# Patient Record
Sex: FEMALE | Race: OTHER | HISPANIC OR LATINO | URBAN - METROPOLITAN AREA
[De-identification: names, ages, dates, MRNs, and addresses within clinical notes are randomized per-mention and may not be internally consistent; named-entity substitution may affect disease eponyms.]

---

## 2017-11-18 ENCOUNTER — EMERGENCY (EMERGENCY)
Facility: HOSPITAL | Age: 27
LOS: 1 days | Discharge: HOME | End: 2017-11-18
Admitting: EMERGENCY MEDICINE
Payer: MEDICAID

## 2017-11-18 VITALS
TEMPERATURE: 98 F | RESPIRATION RATE: 15 BRPM | DIASTOLIC BLOOD PRESSURE: 73 MMHG | OXYGEN SATURATION: 98 % | SYSTOLIC BLOOD PRESSURE: 130 MMHG | HEART RATE: 83 BPM

## 2017-11-18 PROCEDURE — 99285 EMERGENCY DEPT VISIT HI MDM: CPT

## 2017-11-18 RX ORDER — KETOROLAC TROMETHAMINE 30 MG/ML
15 SYRINGE (ML) INJECTION ONCE
Qty: 0 | Refills: 0 | Status: DISCONTINUED | OUTPATIENT
Start: 2017-11-18 | End: 2017-11-18

## 2017-11-18 RX ORDER — DIAZEPAM 5 MG
1 TABLET ORAL
Qty: 8 | Refills: 0 | OUTPATIENT
Start: 2017-11-18 | End: 2017-11-20

## 2017-11-18 RX ADMIN — Medication 15 MILLIGRAM(S): at 22:57

## 2017-11-18 NOTE — ED PROVIDER NOTE - PLAN OF CARE
Use valium as prescribed, do not drive while taking this medication as it can make you sleepy. Follow up with spine clinic as needed. Rest, drink plenty of fluids.  Advance activity as tolerated.  Continue all previously prescribed medications as directed. You can use motrin 600mg every 6-8 hours for pain or fever, and/or Tylenol 650 mg every 4 hours for pain/fever. Follow up with your primary care physician in 48-72 hours- bring copies of your results.  Return to the emergency department for chest pain, shortness of breath, dizziness, or worsening, concerning, or persistent symptoms.

## 2017-11-18 NOTE — ED PROVIDER NOTE - CARE PLAN
Principal Discharge DX:	Back pain  Instructions for follow-up, activity and diet:	Use valium as prescribed, do not drive while taking this medication as it can make you sleepy. Follow up with spine clinic as needed. Rest, drink plenty of fluids.  Advance activity as tolerated.  Continue all previously prescribed medications as directed. You can use motrin 600mg every 6-8 hours for pain or fever, and/or Tylenol 650 mg every 4 hours for pain/fever. Follow up with your primary care physician in 48-72 hours- bring copies of your results.  Return to the emergency department for chest pain, shortness of breath, dizziness, or worsening, concerning, or persistent symptoms.

## 2017-11-18 NOTE — ED ADULT TRIAGE NOTE - CHIEF COMPLAINT QUOTE
Pt c/o mid back pain for the past three weeks.  Denies any burning upon urination or hematuria but states has had increased urinary frequency.  Pt states pain is unrelieved with ibuprofen.  Denies any PMHx.

## 2017-11-18 NOTE — ED PROVIDER NOTE - CHPI ED SYMPTOMS NEG
no weakness/no fever/no vomiting/no chills/no dizziness/no decreased eating/drinking/no numbness/no tingling/no nausea

## 2017-11-18 NOTE — ED PROVIDER NOTE - MEDICAL DECISION MAKING DETAILS
28 yo F here for back pain s/p heavy lifting. Otherwise well. + paraspinal msk tenderness on exam. no direct injury or trauma. +msk sprain, no midline or jurgen tenderness. No incontinence/numbness/saddle anesthesia or red flags. PLAN: nsaids, valium, heat back, fu ortho prn.

## 2017-11-18 NOTE — ED PROVIDER NOTE - PHYSICAL EXAMINATION
BACk: no midline tenderness from c spine to s spine, no palpable step offs, no erythema or ecchymosis or warmth, FROM, NVI, sensate intact. + paraspinal msk tenderness to lumbar area about L 4-5 + spasm.

## 2017-11-18 NOTE — ED PROVIDER NOTE - OBJECTIVE STATEMENT
26 yo F no pmhx here for back pain x 3 weeks. 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 reports 3 weeks ago she picked up luggage and since then has been having mid back pain. Took motrin with minimal relief. Otherwise without complaints. Denies fecal/urinary incontinence. Denies numbness tingling saddle anesthesia. Denies rash, IVUD, fever, chills, vomiting, diarrhea, nausea, abdominal pain, weakness dysuria hematuria

## 2018-02-12 ENCOUNTER — EMERGENCY (EMERGENCY)
Facility: HOSPITAL | Age: 28
LOS: 1 days | Discharge: LEFT BEFORE DISCHARGE | End: 2018-02-12
Admitting: EMERGENCY MEDICINE

## 2018-02-12 VITALS
RESPIRATION RATE: 16 BRPM | HEART RATE: 75 BPM | TEMPERATURE: 98 F | DIASTOLIC BLOOD PRESSURE: 62 MMHG | SYSTOLIC BLOOD PRESSURE: 154 MMHG | OXYGEN SATURATION: 100 %

## 2018-02-12 NOTE — ED ADULT TRIAGE NOTE - CHIEF COMPLAINT QUOTE
Pt arrives w/ c/o lower back pain for last 3 days. Rpts when this occurred a month ago it was muscle spasms.

## 2019-01-16 ENCOUNTER — EMERGENCY (EMERGENCY)
Facility: HOSPITAL | Age: 29
LOS: 1 days | Discharge: HOME | End: 2019-01-16
Admitting: EMERGENCY MEDICINE
Payer: MEDICAID

## 2019-01-16 VITALS
RESPIRATION RATE: 18 BRPM | DIASTOLIC BLOOD PRESSURE: 77 MMHG | HEART RATE: 67 BPM | SYSTOLIC BLOOD PRESSURE: 124 MMHG | TEMPERATURE: 98 F | OXYGEN SATURATION: 99 %

## 2019-01-16 PROCEDURE — 99283 EMERGENCY DEPT VISIT LOW MDM: CPT

## 2019-01-16 RX ORDER — CYCLOBENZAPRINE HYDROCHLORIDE 10 MG/1
10 TABLET, FILM COATED ORAL ONCE
Qty: 0 | Refills: 0 | Status: COMPLETED | OUTPATIENT
Start: 2019-01-16 | End: 2019-01-16

## 2019-01-16 RX ORDER — ACETAMINOPHEN 500 MG
975 TABLET ORAL ONCE
Qty: 0 | Refills: 0 | Status: COMPLETED | OUTPATIENT
Start: 2019-01-16 | End: 2019-01-16

## 2019-01-16 RX ORDER — CYCLOBENZAPRINE HYDROCHLORIDE 10 MG/1
5 TABLET, FILM COATED ORAL ONCE
Qty: 0 | Refills: 0 | Status: DISCONTINUED | OUTPATIENT
Start: 2019-01-16 | End: 2019-01-16

## 2019-01-16 RX ORDER — LIDOCAINE 4 G/100G
1 CREAM TOPICAL ONCE
Qty: 0 | Refills: 0 | Status: COMPLETED | OUTPATIENT
Start: 2019-01-16 | End: 2019-01-16

## 2019-01-16 RX ADMIN — LIDOCAINE 1 PATCH: 4 CREAM TOPICAL at 23:15

## 2019-01-16 RX ADMIN — Medication 975 MILLIGRAM(S): at 23:15

## 2019-01-16 RX ADMIN — CYCLOBENZAPRINE HYDROCHLORIDE 10 MILLIGRAM(S): 10 TABLET, FILM COATED ORAL at 23:31

## 2019-01-16 NOTE — ED PROVIDER NOTE - NSFOLLOWUPINSTRUCTIONS_ED_ALL_ED_FT
Rest, drink plenty of fluids.  Advance activity as tolerated.  Continue all previously prescribed medications as directed.  Follow up with your primary care physician in 48-72 hours- bring copies of your results.  Return to the ER for worsening or persistent symptoms, and/or ANY NEW OR CONCERNING SYMPTOMS. If you have issues obtaining follow up, please call: [phone] to obtain a doctor or specialist who takes your insurance in your area.  You may call [phone] to make an appointment with the internal medicine clinic.
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
## 2019-01-16 NOTE — ED PROVIDER NOTE - PROGRESS NOTE DETAILS
TI Dexter: Agree with TI Mcdaniel, pt neurovascularly intact, no cervical or thoracic spinous process tenderness. No CVA tenderness. Pt ambulatory does have worsening pain when raising R leg. TI Campbell: Pt states that her symptoms has been improving after taking medications. pt advised to return if symptoms do not improve. Dr. Martinez: I am administratively signing this document as per hospital policy.  I was available for consultation for this patient.  I did not evaluate the patient, did not have a doctor/patient relationship with the patient, and did not participate in any medical decision making or disposition decisions unless I am specifically named in the chart as having consulted on the patient.
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
## 2019-01-17 PROCEDURE — 72100 X-RAY EXAM L-S SPINE 2/3 VWS: CPT | Mod: 26

## 2019-01-17 RX ORDER — IBUPROFEN 200 MG
1 TABLET ORAL
Qty: 56 | Refills: 0 | OUTPATIENT
Start: 2019-01-17 | End: 2019-01-30

## 2019-01-17 RX ORDER — IBUPROFEN 200 MG
600 TABLET ORAL ONCE
Qty: 0 | Refills: 0 | Status: COMPLETED | OUTPATIENT
Start: 2019-01-17 | End: 2019-01-17

## 2019-01-17 RX ORDER — CYCLOBENZAPRINE HYDROCHLORIDE 10 MG/1
1 TABLET, FILM COATED ORAL
Qty: 15 | Refills: 0 | OUTPATIENT
Start: 2019-01-17 | End: 2019-01-21

## 2019-01-17 RX ORDER — IBUPROFEN 200 MG
1 TABLET ORAL
Qty: 20 | Refills: 0 | OUTPATIENT
Start: 2019-01-17 | End: 2019-01-21

## 2019-01-17 RX ADMIN — Medication 30 MILLILITER(S): at 00:19

## 2019-01-17 RX ADMIN — Medication 600 MILLIGRAM(S): at 00:19

## 2019-01-17 NOTE — ED POST DISCHARGE NOTE - REASON FOR FOLLOW-UP
Other Pt called rx for motrin and flexeril not at her pharmacy.   Rx's resent to same pharmacy for flexeril 10 mg 1 tab three times a day for 5 days and ibuprofen 600 every 6 hours both prn as written  by dr hills yesterday

## 2019-03-04 ENCOUNTER — EMERGENCY (EMERGENCY)
Facility: HOSPITAL | Age: 29
LOS: 1 days | Discharge: HOME | End: 2019-03-04
Attending: EMERGENCY MEDICINE | Admitting: EMERGENCY MEDICINE
Payer: MEDICAID

## 2019-03-04 VITALS
RESPIRATION RATE: 18 BRPM | SYSTOLIC BLOOD PRESSURE: 120 MMHG | DIASTOLIC BLOOD PRESSURE: 84 MMHG | TEMPERATURE: 98 F | OXYGEN SATURATION: 100 % | HEART RATE: 95 BPM

## 2019-03-04 PROCEDURE — 72220 X-RAY EXAM SACRUM TAILBONE: CPT | Mod: 26

## 2019-03-04 PROCEDURE — 99283 EMERGENCY DEPT VISIT LOW MDM: CPT

## 2019-03-04 RX ORDER — METHOCARBAMOL 500 MG/1
2 TABLET, FILM COATED ORAL
Qty: 30 | Refills: 0 | OUTPATIENT
Start: 2019-03-04 | End: 2019-03-08

## 2019-03-04 RX ORDER — IBUPROFEN 200 MG
1 TABLET ORAL
Qty: 56 | Refills: 0 | OUTPATIENT
Start: 2019-03-04 | End: 2019-03-17

## 2019-03-04 RX ORDER — KETOROLAC TROMETHAMINE 30 MG/ML
30 SYRINGE (ML) INJECTION ONCE
Qty: 0 | Refills: 0 | Status: DISCONTINUED | OUTPATIENT
Start: 2019-03-04 | End: 2019-03-04

## 2019-03-04 RX ORDER — METHOCARBAMOL 500 MG/1
1500 TABLET, FILM COATED ORAL ONCE
Qty: 0 | Refills: 0 | Status: COMPLETED | OUTPATIENT
Start: 2019-03-04 | End: 2019-03-04

## 2019-03-04 RX ORDER — LIDOCAINE 4 G/100G
1 CREAM TOPICAL ONCE
Qty: 0 | Refills: 0 | Status: COMPLETED | OUTPATIENT
Start: 2019-03-04 | End: 2019-03-04

## 2019-03-04 RX ORDER — ACETAMINOPHEN 500 MG
650 TABLET ORAL ONCE
Qty: 0 | Refills: 0 | Status: COMPLETED | OUTPATIENT
Start: 2019-03-04 | End: 2019-03-04

## 2019-03-04 RX ADMIN — LIDOCAINE 1 PATCH: 4 CREAM TOPICAL at 17:29

## 2019-03-04 RX ADMIN — METHOCARBAMOL 1500 MILLIGRAM(S): 500 TABLET, FILM COATED ORAL at 17:59

## 2019-03-04 RX ADMIN — Medication 30 MILLIGRAM(S): at 17:29

## 2019-03-04 RX ADMIN — Medication 650 MILLIGRAM(S): at 17:30

## 2019-03-04 NOTE — ED PROVIDER NOTE - PROGRESS NOTE DETAILS
Taveras: improved.  ambulating. xr shows fracture at tip of coccyx.  dx fracture at tip of coccyx.  rx robaxin and ibuprofen.  can also take tylenol 650mg every 4 hrs as needed,  heat packs, obtain physical therapy, no heavy lifting.  obtain donut pillow for seating. left ambulatory.  return precautions given.

## 2019-03-04 NOTE — ED ADULT TRIAGE NOTE - CHIEF COMPLAINT QUOTE
Pt s/p fall slipped 2 weeks ago injuring coccyx bone. pt reports  increased pain . pt ambulatory into triage araujo.
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
## 2019-08-10 ENCOUNTER — EMERGENCY (EMERGENCY)
Facility: HOSPITAL | Age: 29
LOS: 1 days | Discharge: HOME | End: 2019-08-10
Attending: EMERGENCY MEDICINE | Admitting: EMERGENCY MEDICINE
Payer: MEDICAID

## 2019-08-10 VITALS
RESPIRATION RATE: 18 BRPM | DIASTOLIC BLOOD PRESSURE: 62 MMHG | HEART RATE: 82 BPM | OXYGEN SATURATION: 100 % | SYSTOLIC BLOOD PRESSURE: 107 MMHG | TEMPERATURE: 98 F

## 2019-08-10 LAB
ALBUMIN SERPL ELPH-MCNC: 4.3 G/DL — SIGNIFICANT CHANGE UP (ref 3.3–5)
ALP SERPL-CCNC: 77 U/L — SIGNIFICANT CHANGE UP (ref 40–120)
ALT FLD-CCNC: 19 U/L — SIGNIFICANT CHANGE UP (ref 4–33)
ANION GAP SERPL CALC-SCNC: 16 MMO/L — HIGH (ref 7–14)
AST SERPL-CCNC: 16 U/L — SIGNIFICANT CHANGE UP (ref 4–32)
BASOPHILS # BLD AUTO: 0.05 K/UL — SIGNIFICANT CHANGE UP (ref 0–0.2)
BASOPHILS NFR BLD AUTO: 0.6 % — SIGNIFICANT CHANGE UP (ref 0–2)
BILIRUB SERPL-MCNC: 0.4 MG/DL — SIGNIFICANT CHANGE UP (ref 0.2–1.2)
BUN SERPL-MCNC: 14 MG/DL — SIGNIFICANT CHANGE UP (ref 7–23)
CALCIUM SERPL-MCNC: 9.5 MG/DL — SIGNIFICANT CHANGE UP (ref 8.4–10.5)
CHLORIDE SERPL-SCNC: 105 MMOL/L — SIGNIFICANT CHANGE UP (ref 98–107)
CO2 SERPL-SCNC: 19 MMOL/L — LOW (ref 22–31)
CREAT SERPL-MCNC: 0.75 MG/DL — SIGNIFICANT CHANGE UP (ref 0.5–1.3)
EOSINOPHIL # BLD AUTO: 0.32 K/UL — SIGNIFICANT CHANGE UP (ref 0–0.5)
EOSINOPHIL NFR BLD AUTO: 4.1 % — SIGNIFICANT CHANGE UP (ref 0–6)
GLUCOSE SERPL-MCNC: 93 MG/DL — SIGNIFICANT CHANGE UP (ref 70–99)
HCT VFR BLD CALC: 37.7 % — SIGNIFICANT CHANGE UP (ref 34.5–45)
HGB BLD-MCNC: 12.4 G/DL — SIGNIFICANT CHANGE UP (ref 11.5–15.5)
IMM GRANULOCYTES NFR BLD AUTO: 0.3 % — SIGNIFICANT CHANGE UP (ref 0–1.5)
LYMPHOCYTES # BLD AUTO: 2.25 K/UL — SIGNIFICANT CHANGE UP (ref 1–3.3)
LYMPHOCYTES # BLD AUTO: 28.7 % — SIGNIFICANT CHANGE UP (ref 13–44)
MCHC RBC-ENTMCNC: 30.2 PG — SIGNIFICANT CHANGE UP (ref 27–34)
MCHC RBC-ENTMCNC: 32.9 % — SIGNIFICANT CHANGE UP (ref 32–36)
MCV RBC AUTO: 92 FL — SIGNIFICANT CHANGE UP (ref 80–100)
MONOCYTES # BLD AUTO: 0.62 K/UL — SIGNIFICANT CHANGE UP (ref 0–0.9)
MONOCYTES NFR BLD AUTO: 7.9 % — SIGNIFICANT CHANGE UP (ref 2–14)
NEUTROPHILS # BLD AUTO: 4.59 K/UL — SIGNIFICANT CHANGE UP (ref 1.8–7.4)
NEUTROPHILS NFR BLD AUTO: 58.4 % — SIGNIFICANT CHANGE UP (ref 43–77)
NRBC # FLD: 0 K/UL — SIGNIFICANT CHANGE UP (ref 0–0)
PLATELET # BLD AUTO: 268 K/UL — SIGNIFICANT CHANGE UP (ref 150–400)
PMV BLD: 9.7 FL — SIGNIFICANT CHANGE UP (ref 7–13)
POTASSIUM SERPL-MCNC: 3.9 MMOL/L — SIGNIFICANT CHANGE UP (ref 3.5–5.3)
POTASSIUM SERPL-SCNC: 3.9 MMOL/L — SIGNIFICANT CHANGE UP (ref 3.5–5.3)
PROT SERPL-MCNC: 7.7 G/DL — SIGNIFICANT CHANGE UP (ref 6–8.3)
RBC # BLD: 4.1 M/UL — SIGNIFICANT CHANGE UP (ref 3.8–5.2)
RBC # FLD: 13.4 % — SIGNIFICANT CHANGE UP (ref 10.3–14.5)
SODIUM SERPL-SCNC: 140 MMOL/L — SIGNIFICANT CHANGE UP (ref 135–145)
WBC # BLD: 7.85 K/UL — SIGNIFICANT CHANGE UP (ref 3.8–10.5)
WBC # FLD AUTO: 7.85 K/UL — SIGNIFICANT CHANGE UP (ref 3.8–10.5)

## 2019-08-10 PROCEDURE — 74177 CT ABD & PELVIS W/CONTRAST: CPT | Mod: 26

## 2019-08-10 PROCEDURE — 99284 EMERGENCY DEPT VISIT MOD MDM: CPT

## 2019-08-10 RX ORDER — ACETAMINOPHEN 500 MG
975 TABLET ORAL ONCE
Refills: 0 | Status: COMPLETED | OUTPATIENT
Start: 2019-08-10 | End: 2019-08-10

## 2019-08-10 RX ADMIN — Medication 975 MILLIGRAM(S): at 16:57

## 2019-08-10 NOTE — ED ADULT TRIAGE NOTE - CHIEF COMPLAINT QUOTE
c/o abd pain at the incision site. pt reports s/p bariatric surgery last month, denies fever or chills, N.V. site is clear and WNL, no discharge or drainage noted, area is tender to touch.

## 2019-08-10 NOTE — ED PROVIDER NOTE - PHYSICAL EXAMINATION
Gen: AAOx3, non-toxic  Head: NCAT  Lung: CTAB, no respiratory distress, no wheezes/rhonchi/rales B/L, speaking in full sentences  CV: RRR, no murmurs, rubs or gallops  Abd: soft, tenderness in area 6cm superior and 3cm to the right of the umbilicus, no skin changes or fluctuance noted over area of tenderness, surgical wounds are well healing, no erythema or drainage, no CVA tenderness  MSK: no visible deformities  Neuro: No focal sensory or motor deficits  Skin: Warm, well perfused, no rash  Psych: normal affect.   ~Ramiro Valero PGY2

## 2019-08-10 NOTE — ED PROVIDER NOTE - OBJECTIVE STATEMENT
29y female with PMH of cholecystectomy, tuboligation, and gastric sleeve 3 weeks ago at [facility] presenting with abdominal pain. Started right after the surgery, burning/tearing pain approx. 6cm superior and 3cm to the right of the umbilicus, pain is worse with any movement or palpation of the area. Tolerating PO, not worse with eating, no diarrhea, no fevers, chills or vomiting. She has been constipated since the surgey 1-2 BM a week.

## 2019-08-10 NOTE — ED PROVIDER NOTE - CLINICAL SUMMARY MEDICAL DECISION MAKING FREE TEXT BOX
29y female with abdominal pain after gastric sleeve. Area of tenderness appears to be on abdominal wall, tender to light palpation, no skin changes. Concern for fluid collection or hernia. Infectious cause less likely given no infectious symptoms. Patient is tolerating PO, no vomiting, having BM not concerned for obstruction. Will get labs, CT abd/pel.

## 2019-08-10 NOTE — ED PROVIDER NOTE - NSFOLLOWUPINSTRUCTIONS_ED_ALL_ED_FT
Follow-up with your surgeon at [facility] on Monday for further evaluation. Discuss your CT results today with them at follow-up.     Follow post-operative instructions as described by your surgeon.     Return to the ED for any worsening pain, fevers, redness, drainage from the incision sites, or any new or worsening symptoms.

## 2019-08-10 NOTE — ED PROVIDER NOTE - ATTENDING CONTRIBUTION TO CARE
I performed a face-to-face evaluation of the patient and performed a history and physical examination. I agree with the history and physical examination.    S/p gastric sleeve surg. 3 wks ago. P/w RLQ pain. Tolerating PO. Incision sites appear uninfected. TTP RLQ. Concern for internal hernia. Labs, CT. I performed a face-to-face evaluation of the patient and performed a history and physical examination. I agree with the history and physical examination.    S/p gastric sleeve surg. 3 wks ago. P/w pain R abd b/w upper and lower quarants. Tolerating PO. Incision sites appear uninfected. TTP R middle abd. Concern for internal hernia or seroma. Labs, CT.

## 2019-08-10 NOTE — ED PROVIDER NOTE - PROGRESS NOTE DETAILS
Ford- received stable signout of patient, CT results pending. CT results show post-operative changes, no fluid collection or obstruction. Patient to follow-up with her surgeon at [facility] for further discussion and evaluation of her pain.     Patient reassessed - states pain is somewhat improved from before. CT results discussed with patient and she is amenable to f/u with her surgeon at [facility]. States she feels okay and would like to leave to go  her daughter.

## 2019-08-10 NOTE — ED PROVIDER NOTE - NS ED ROS FT
Gen: No fever, No chills  Eyes: No vision changes, eye irritation or discharge  Resp: No cough or SOB  Cardiovascular: No chest pain or palpitations  GI: +abdominal pain No nausea/vomiting, diarrhea, constipation  :  No change in urine output or frequency; no dysuria  MS: No joint or muscle pain  Skin: No rashes  Neuro: No headache; No numbness or weakness  Remainder negative, except as per the HPI

## 2019-09-10 ENCOUNTER — EMERGENCY (EMERGENCY)
Facility: HOSPITAL | Age: 29
LOS: 1 days | Discharge: HOME | End: 2019-09-10
Attending: STUDENT IN AN ORGANIZED HEALTH CARE EDUCATION/TRAINING PROGRAM | Admitting: STUDENT IN AN ORGANIZED HEALTH CARE EDUCATION/TRAINING PROGRAM
Payer: MEDICAID

## 2019-09-10 VITALS
DIASTOLIC BLOOD PRESSURE: 65 MMHG | RESPIRATION RATE: 18 BRPM | OXYGEN SATURATION: 99 % | SYSTOLIC BLOOD PRESSURE: 115 MMHG | TEMPERATURE: 98 F | HEART RATE: 74 BPM

## 2019-09-10 LAB
ALBUMIN SERPL ELPH-MCNC: 4 G/DL — SIGNIFICANT CHANGE UP (ref 3.3–5)
ALP SERPL-CCNC: 81 U/L — SIGNIFICANT CHANGE UP (ref 40–120)
ALT FLD-CCNC: 15 U/L — SIGNIFICANT CHANGE UP (ref 4–33)
ANION GAP SERPL CALC-SCNC: 8 MMO/L — SIGNIFICANT CHANGE UP (ref 7–14)
AST SERPL-CCNC: 16 U/L — SIGNIFICANT CHANGE UP (ref 4–32)
BASOPHILS # BLD AUTO: 0.02 K/UL — SIGNIFICANT CHANGE UP (ref 0–0.2)
BASOPHILS NFR BLD AUTO: 0.3 % — SIGNIFICANT CHANGE UP (ref 0–2)
BILIRUB SERPL-MCNC: 0.6 MG/DL — SIGNIFICANT CHANGE UP (ref 0.2–1.2)
BUN SERPL-MCNC: 14 MG/DL — SIGNIFICANT CHANGE UP (ref 7–23)
CALCIUM SERPL-MCNC: 9.3 MG/DL — SIGNIFICANT CHANGE UP (ref 8.4–10.5)
CHLORIDE SERPL-SCNC: 107 MMOL/L — SIGNIFICANT CHANGE UP (ref 98–107)
CO2 SERPL-SCNC: 21 MMOL/L — LOW (ref 22–31)
CREAT SERPL-MCNC: 0.68 MG/DL — SIGNIFICANT CHANGE UP (ref 0.5–1.3)
EOSINOPHIL # BLD AUTO: 0.08 K/UL — SIGNIFICANT CHANGE UP (ref 0–0.5)
EOSINOPHIL NFR BLD AUTO: 1.1 % — SIGNIFICANT CHANGE UP (ref 0–6)
GLUCOSE SERPL-MCNC: 101 MG/DL — HIGH (ref 70–99)
HCT VFR BLD CALC: 39.5 % — SIGNIFICANT CHANGE UP (ref 34.5–45)
HGB BLD-MCNC: 13.2 G/DL — SIGNIFICANT CHANGE UP (ref 11.5–15.5)
IMM GRANULOCYTES NFR BLD AUTO: 0.4 % — SIGNIFICANT CHANGE UP (ref 0–1.5)
LYMPHOCYTES # BLD AUTO: 1.71 K/UL — SIGNIFICANT CHANGE UP (ref 1–3.3)
LYMPHOCYTES # BLD AUTO: 22.5 % — SIGNIFICANT CHANGE UP (ref 13–44)
MAGNESIUM SERPL-MCNC: 2.1 MG/DL — SIGNIFICANT CHANGE UP (ref 1.6–2.6)
MCHC RBC-ENTMCNC: 31.3 PG — SIGNIFICANT CHANGE UP (ref 27–34)
MCHC RBC-ENTMCNC: 33.4 % — SIGNIFICANT CHANGE UP (ref 32–36)
MCV RBC AUTO: 93.6 FL — SIGNIFICANT CHANGE UP (ref 80–100)
MONOCYTES # BLD AUTO: 0.47 K/UL — SIGNIFICANT CHANGE UP (ref 0–0.9)
MONOCYTES NFR BLD AUTO: 6.2 % — SIGNIFICANT CHANGE UP (ref 2–14)
NEUTROPHILS # BLD AUTO: 5.28 K/UL — SIGNIFICANT CHANGE UP (ref 1.8–7.4)
NEUTROPHILS NFR BLD AUTO: 69.5 % — SIGNIFICANT CHANGE UP (ref 43–77)
NRBC # FLD: 0 K/UL — SIGNIFICANT CHANGE UP (ref 0–0)
PHOSPHATE SERPL-MCNC: 3.6 MG/DL — SIGNIFICANT CHANGE UP (ref 2.5–4.5)
PLATELET # BLD AUTO: 248 K/UL — SIGNIFICANT CHANGE UP (ref 150–400)
PMV BLD: 9.9 FL — SIGNIFICANT CHANGE UP (ref 7–13)
POTASSIUM SERPL-MCNC: 3.5 MMOL/L — SIGNIFICANT CHANGE UP (ref 3.5–5.3)
POTASSIUM SERPL-SCNC: 3.5 MMOL/L — SIGNIFICANT CHANGE UP (ref 3.5–5.3)
PROT SERPL-MCNC: 7.3 G/DL — SIGNIFICANT CHANGE UP (ref 6–8.3)
RBC # BLD: 4.22 M/UL — SIGNIFICANT CHANGE UP (ref 3.8–5.2)
RBC # FLD: 14.3 % — SIGNIFICANT CHANGE UP (ref 10.3–14.5)
SODIUM SERPL-SCNC: 136 MMOL/L — SIGNIFICANT CHANGE UP (ref 135–145)
WBC # BLD: 7.59 K/UL — SIGNIFICANT CHANGE UP (ref 3.8–10.5)
WBC # FLD AUTO: 7.59 K/UL — SIGNIFICANT CHANGE UP (ref 3.8–10.5)

## 2019-09-10 PROCEDURE — 99284 EMERGENCY DEPT VISIT MOD MDM: CPT

## 2019-09-10 RX ORDER — FAMOTIDINE 10 MG/ML
1 INJECTION INTRAVENOUS
Qty: 10 | Refills: 0
Start: 2019-09-10 | End: 2019-09-14

## 2019-09-10 RX ORDER — DIPHENHYDRAMINE HCL 50 MG
50 CAPSULE ORAL ONCE
Refills: 0 | Status: COMPLETED | OUTPATIENT
Start: 2019-09-10 | End: 2019-09-10

## 2019-09-10 RX ORDER — FAMOTIDINE 10 MG/ML
20 INJECTION INTRAVENOUS ONCE
Refills: 0 | Status: COMPLETED | OUTPATIENT
Start: 2019-09-10 | End: 2019-09-10

## 2019-09-10 RX ADMIN — Medication 50 MILLIGRAM(S): at 17:12

## 2019-09-10 RX ADMIN — FAMOTIDINE 20 MILLIGRAM(S): 10 INJECTION INTRAVENOUS at 17:12

## 2019-09-10 NOTE — ED ADULT NURSE NOTE - NSIMPLEMENTINTERV_GEN_ALL_ED
Implemented All Universal Safety Interventions:  [place] to call system. Call bell, personal items and telephone within reach. Instruct patient to call for assistance. Room bathroom lighting operational. Non-slip footwear when patient is off stretcher. Physically safe environment: no spills, clutter or unnecessary equipment. Stretcher in lowest position, wheels locked, appropriate side rails in place.

## 2019-09-10 NOTE — ED PROVIDER NOTE - CLINICAL SUMMARY MEDICAL DECISION MAKING FREE TEXT BOX
29F p/w puritic rash and scratchy throat since 3pm yesterday after eating out at restaurant with no other allergen/environmental exposures - will give pepcid, benadryl, prednisone, monitor for 4 hours 29F p/w puritic rash and since 3pm yesterday after eating out at restaurant with no other allergen/environmental exposures - will give pepcid, benadryl, prednisone

## 2019-09-10 NOTE — ED PROVIDER NOTE - PATIENT PORTAL LINK FT
You can access the FollowMyHealth Patient Portal offered by [facility] by registering at the following website: [email or web address]. By joining [employer]’s FollowMyHealth portal, you will also be able to view your health information using other applications (apps) compatible with our system.

## 2019-09-10 NOTE — ED PROVIDER NOTE - NSFOLLOWUPINSTRUCTIONS_ED_ALL_ED_FT
Please follow up with your primary care doctor within the week  Continue benadryl 25mg every 8 hours, pepcid 20mg two times a day and prednisone 50mg daily for the next 4 days to complete a full 5 day course  Return to ER with any new/worsening symptoms including facial swelling, throat itching, difficulty breathing/swallowing, abdominal pain, or anything else concerning to you

## 2019-09-10 NOTE — ED ADULT TRIAGE NOTE - CHIEF COMPLAINT QUOTE
Pt is here with the c/o hives all over her body, her eyes are watery and itchy. Pt had broccoli from a place that she does not usually eat. She started to have this reaction yesterday.

## 2019-09-10 NOTE — ED ADULT NURSE NOTE - OBJECTIVE STATEMENT
Pt received in intake spot 9, A&Ox4, NAD.  c/o allergic reaction since yesterday after eating at a new restaurant.  Pt endorses itchiness and hives.  Pt endorses mild throat itching but denies any difficulty swallowing to breathing.  Pt appears comfortable.  Tolerating PO.  Will continue to monitor.

## 2019-09-10 NOTE — ED PROVIDER NOTE - OBJECTIVE STATEMENT
29F no PMH p/w rashes and puritis since yesterday. Pt states that she was eating chicken and broccoli at restaurant and symptoms started soon after (~1500 yesterday). States that she took benadryl 50mg x2 yesterday without improvement in puritis. Rash diffusely to chest, arms, legs and face. Denies chest pain, SOB, difficulty swallowing. Reports some "itching" in back of throat but has been eating without difficulty. No tongue/facial swelling. No other new allergen or environmental exposures. 29F no PMH p/w rashes and puritis since yesterday. Pt states that she was eating chicken and broccoli at restaurant and symptoms started soon after (~1500 yesterday). States that she took benadryl 50mg x2 yesterday without improvement in puritis. Rash diffusely to chest, arms, legs and face. Denies chest pain, SOB, difficulty swallowing. Reports some "itching" in back of throat yesterday that resolved and has been eating without difficulty. No tongue/facial swelling. No other new allergen or environmental exposures.

## 2021-03-25 ENCOUNTER — EMERGENCY (EMERGENCY)
Facility: HOSPITAL | Age: 31
LOS: 1 days | Discharge: HOME | End: 2021-03-25
Attending: STUDENT IN AN ORGANIZED HEALTH CARE EDUCATION/TRAINING PROGRAM | Admitting: STUDENT IN AN ORGANIZED HEALTH CARE EDUCATION/TRAINING PROGRAM
Payer: MEDICAID

## 2021-03-25 VITALS
HEART RATE: 77 BPM | SYSTOLIC BLOOD PRESSURE: 113 MMHG | DIASTOLIC BLOOD PRESSURE: 67 MMHG | TEMPERATURE: 98 F | OXYGEN SATURATION: 100 % | RESPIRATION RATE: 16 BRPM

## 2021-03-25 LAB
ALBUMIN SERPL ELPH-MCNC: 3.9 G/DL — SIGNIFICANT CHANGE UP (ref 3.3–5)
ALP SERPL-CCNC: 72 U/L — SIGNIFICANT CHANGE UP (ref 40–120)
ALT FLD-CCNC: 12 U/L — SIGNIFICANT CHANGE UP (ref 4–33)
ANION GAP SERPL CALC-SCNC: 8 MMOL/L — SIGNIFICANT CHANGE UP (ref 7–14)
APPEARANCE UR: CLEAR — SIGNIFICANT CHANGE UP
AST SERPL-CCNC: 18 U/L — SIGNIFICANT CHANGE UP (ref 4–32)
BASOPHILS # BLD AUTO: 0.02 K/UL — SIGNIFICANT CHANGE UP (ref 0–0.2)
BASOPHILS NFR BLD AUTO: 0.3 % — SIGNIFICANT CHANGE UP (ref 0–2)
BILIRUB SERPL-MCNC: 0.5 MG/DL — SIGNIFICANT CHANGE UP (ref 0.2–1.2)
BILIRUB UR-MCNC: NEGATIVE — SIGNIFICANT CHANGE UP
BUN SERPL-MCNC: 14 MG/DL — SIGNIFICANT CHANGE UP (ref 7–23)
CALCIUM SERPL-MCNC: 8.9 MG/DL — SIGNIFICANT CHANGE UP (ref 8.4–10.5)
CHLORIDE SERPL-SCNC: 105 MMOL/L — SIGNIFICANT CHANGE UP (ref 98–107)
CO2 SERPL-SCNC: 22 MMOL/L — SIGNIFICANT CHANGE UP (ref 22–31)
COLOR SPEC: SIGNIFICANT CHANGE UP
CREAT SERPL-MCNC: 0.56 MG/DL — SIGNIFICANT CHANGE UP (ref 0.5–1.3)
DIFF PNL FLD: NEGATIVE — SIGNIFICANT CHANGE UP
EOSINOPHIL # BLD AUTO: 0.1 K/UL — SIGNIFICANT CHANGE UP (ref 0–0.5)
EOSINOPHIL NFR BLD AUTO: 1.3 % — SIGNIFICANT CHANGE UP (ref 0–6)
GLUCOSE SERPL-MCNC: 83 MG/DL — SIGNIFICANT CHANGE UP (ref 70–99)
GLUCOSE UR QL: NEGATIVE — SIGNIFICANT CHANGE UP
HCG UR QL: NEGATIVE — SIGNIFICANT CHANGE UP
HCT VFR BLD CALC: 31.8 % — LOW (ref 34.5–45)
HGB BLD-MCNC: 10.6 G/DL — LOW (ref 11.5–15.5)
IANC: 6.14 K/UL — SIGNIFICANT CHANGE UP (ref 1.5–8.5)
IMM GRANULOCYTES NFR BLD AUTO: 0.3 % — SIGNIFICANT CHANGE UP (ref 0–1.5)
KETONES UR-MCNC: ABNORMAL
LEUKOCYTE ESTERASE UR-ACNC: NEGATIVE — SIGNIFICANT CHANGE UP
LYMPHOCYTES # BLD AUTO: 0.99 K/UL — LOW (ref 1–3.3)
LYMPHOCYTES # BLD AUTO: 12.7 % — LOW (ref 13–44)
MAGNESIUM SERPL-MCNC: 2 MG/DL — SIGNIFICANT CHANGE UP (ref 1.6–2.6)
MCHC RBC-ENTMCNC: 30.8 PG — SIGNIFICANT CHANGE UP (ref 27–34)
MCHC RBC-ENTMCNC: 33.3 GM/DL — SIGNIFICANT CHANGE UP (ref 32–36)
MCV RBC AUTO: 92.4 FL — SIGNIFICANT CHANGE UP (ref 80–100)
MONOCYTES # BLD AUTO: 0.52 K/UL — SIGNIFICANT CHANGE UP (ref 0–0.9)
MONOCYTES NFR BLD AUTO: 6.7 % — SIGNIFICANT CHANGE UP (ref 2–14)
NEUTROPHILS # BLD AUTO: 6.14 K/UL — SIGNIFICANT CHANGE UP (ref 1.8–7.4)
NEUTROPHILS NFR BLD AUTO: 78.7 % — HIGH (ref 43–77)
NITRITE UR-MCNC: NEGATIVE — SIGNIFICANT CHANGE UP
NRBC # BLD: 0 /100 WBCS — SIGNIFICANT CHANGE UP
NRBC # FLD: 0 K/UL — SIGNIFICANT CHANGE UP
PH UR: 6.5 — SIGNIFICANT CHANGE UP (ref 5–8)
PLATELET # BLD AUTO: 238 K/UL — SIGNIFICANT CHANGE UP (ref 150–400)
POTASSIUM SERPL-MCNC: 3.7 MMOL/L — SIGNIFICANT CHANGE UP (ref 3.5–5.3)
POTASSIUM SERPL-SCNC: 3.7 MMOL/L — SIGNIFICANT CHANGE UP (ref 3.5–5.3)
PROT SERPL-MCNC: 7.4 G/DL — SIGNIFICANT CHANGE UP (ref 6–8.3)
PROT UR-MCNC: NEGATIVE — SIGNIFICANT CHANGE UP
RBC # BLD: 3.44 M/UL — LOW (ref 3.8–5.2)
RBC # FLD: 12.5 % — SIGNIFICANT CHANGE UP (ref 10.3–14.5)
SODIUM SERPL-SCNC: 135 MMOL/L — SIGNIFICANT CHANGE UP (ref 135–145)
SP GR SPEC: 1.02 — SIGNIFICANT CHANGE UP (ref 1.01–1.02)
UROBILINOGEN FLD QL: SIGNIFICANT CHANGE UP
WBC # BLD: 7.79 K/UL — SIGNIFICANT CHANGE UP (ref 3.8–10.5)
WBC # FLD AUTO: 7.79 K/UL — SIGNIFICANT CHANGE UP (ref 3.8–10.5)

## 2021-03-25 PROCEDURE — 99284 EMERGENCY DEPT VISIT MOD MDM: CPT

## 2021-03-25 RX ORDER — ACETAMINOPHEN 500 MG
650 TABLET ORAL ONCE
Refills: 0 | Status: COMPLETED | OUTPATIENT
Start: 2021-03-25 | End: 2021-03-25

## 2021-03-25 RX ORDER — DEXAMETHASONE 0.5 MG/5ML
6 ELIXIR ORAL ONCE
Refills: 0 | Status: COMPLETED | OUTPATIENT
Start: 2021-03-25 | End: 2021-03-25

## 2021-03-25 RX ORDER — SODIUM CHLORIDE 9 MG/ML
1000 INJECTION, SOLUTION INTRAVENOUS ONCE
Refills: 0 | Status: COMPLETED | OUTPATIENT
Start: 2021-03-25 | End: 2021-03-25

## 2021-03-25 RX ORDER — KETOROLAC TROMETHAMINE 30 MG/ML
15 SYRINGE (ML) INJECTION ONCE
Refills: 0 | Status: DISCONTINUED | OUTPATIENT
Start: 2021-03-25 | End: 2021-03-25

## 2021-03-25 RX ADMIN — Medication 15 MILLIGRAM(S): at 09:52

## 2021-03-25 RX ADMIN — SODIUM CHLORIDE 1000 MILLILITER(S): 9 INJECTION, SOLUTION INTRAVENOUS at 09:52

## 2021-03-25 RX ADMIN — Medication 6 MILLIGRAM(S): at 09:52

## 2021-03-25 RX ADMIN — Medication 650 MILLIGRAM(S): at 09:52

## 2021-03-25 NOTE — ED PROVIDER NOTE - PATIENT PORTAL LINK FT
You can access the FollowMyHealth Patient Portal offered by [facility] by registering at the following website: [email or web address]. By joining [employer]’s FollowMyHealth portal, you will also be able to view your health information using other applications (apps) compatible with our system.

## 2021-03-25 NOTE — ED PROVIDER NOTE - OBJECTIVE STATEMENT
31 yo F w/o sig PMH, presenting s/p right wisdom tooth extraction 5 days ago, complaining of worsening pain, swelling and difficulty swallowing solids and liquids since. She states that her oral surgeon started her on erythrocin initially but then switched her to keflex yesterday because the other pills "weren't working." She has been taking 800 mg ibuprofen for pain without improvement. She says that when she lies flat she has some trouble breathing and has had difficulty sleeping over the past few nights because of that. She denies any fevers, chills, chest pain, shortness of breath, cough, oral bleeding or drainage, difficulty tolerating her own secretions, drooling, or other complaints at this time.

## 2021-03-25 NOTE — ED ADULT NURSE NOTE - CHIEF COMPLAINT QUOTE
Pt s/p R wisdom tooth extraction last Sat c/o difficulty swallowing, throat pain and facial swelling, with chills.  Pt stated she took cephalexin  this am, and feels like its stuck in her throat.  No stridor noted.  Resp unlabored at triage.   #[phone]

## 2021-03-25 NOTE — ED ADULT NURSE NOTE - OBJECTIVE STATEMENT
29y/o female A&ox4, ambulatory received in rm13. pt s/p R wisdom tooth surgery this past saturday and is c/o difficulty swallowing and throat pain. pt believes oral antibiotic pill is stuck in throat. pt able to complete full sentences, no use of accessory muscles noted. mild R sided facial swelling noted, no drooling noted around mouth. 20g iv placed in LAC, labs drawn and sent. medicated as per MD orders. bed in lowest position, side rails up, call bell in hand, safety maintained. awaiting further orders. will continue to monitor.

## 2021-03-25 NOTE — ED PROVIDER NOTE - ATTENDING CONTRIBUTION TO CARE
31 y/o F with no PMH p/w right sided jaw pain and swelling. Pt had her wisdom teeth removed on saturday. Since then she has been having increased swelling of the right sided of the neck and mouth along with pain. She reports that she has pain worse with swallowing. She denies fever, she was recently started on keflex after not improving on azithro. She denies chest pain, drooling, cough. She states she feels like she has a pill stuck in her throat .   denies fever, chills, chest pain, SOB, abdominal pain, diarrhea, dysuria, syncope, bleeding, new rash,weakness, numbness, blurred vision  + sore throat   ROS  otherwise negative as per HPI  Gen: Awake, Alert, WD, WN, NAD  Head:  NC/AT  Eyes:  PERRL, EOMI, Conjunctiva pink, lids normal, no scleral icterus  ENT: OP clear, no exudates, no erythema, uvula midline mild swelling on right lower jaw, moist mucus membranes, mild trismus   Neck: supple, tender on right side lower, trachea midline  Cardiac/CV:  S1 S2, RRR, no M/G/R  Respiratory/Pulm:  CTAB, good air movement, normal resp effort, no wheezes/stridor/retractions/rales/rhonchi  Gastrointestinal/Abdomen:  Soft, nontender, nondistended, +BS, no rebound/guarding  Back:  no CVAT, no MLT  Ext:  warm, well perfused, moving all extremities spontaneously, no peripheral edema, distal pulses intact  Skin: intact, no rash  Neuro:  AAOx3, sensation intact, motor 5/5 x 4 extremities, normal gait, speech clear  MDM as above

## 2021-03-25 NOTE — ED PROVIDER NOTE - NS ED ROS FT
Gen: Denies fever, weight loss  CV: Denies chest pain, palpitations  Skin: Denies rash, erythema, color changes  Resp: see HPI  Endo: Denies sensitivity to heat, cold, increased urination  GI: see HPI  Msk: Denies back pain, LE swelling, extremity pain  : Denies dysuria, increased frequency  Neuro: Denies LOC, weakness, seizures  Psych: Denies hx of psych, hallucinations

## 2021-03-25 NOTE — ED PROVIDER NOTE - PROGRESS NOTE DETAILS
Lyle SEAY, PGY1: Pt much improved s/p medication (decadron, toradol, tylenol) tolerating sips of water, no stridor, no resp distress, no drooling, no induration to neck or firmness of oral floor. Will prescribe clindamycin oral solution and d/c with outpt f/u. Pt stable and agreeable.

## 2021-03-25 NOTE — ED ADULT TRIAGE NOTE - CHIEF COMPLAINT QUOTE
Pt s/p R wisdom tooth extraction last Sat c/o difficulty swallowing, throat pain and facial swelling, with chills.  Pt stated she took cephalexin  this am, and feels like its stuck in her throat.  No stridor noted.  Resp unlabored at triage.   #[phone]

## 2021-03-25 NOTE — ED PROVIDER NOTE - CLINICAL SUMMARY MEDICAL DECISION MAKING FREE TEXT BOX
Lyle SEAY, PGY1: 31 yo F no sig PMH, p/w worsening pain/swelling s/p wisdom tooth extraction 5 days ago. VSS and wnl, afebrile. Tolerating secretions, no stridor or uvular deviation on exam, no firmness or induration of neck/oral floor. Clinically less concerned for ludwigs, likely worsening postop pain/swelling. Will treat with toradol/tylenol/decadron, check labs and reassess.

## 2021-03-25 NOTE — ED PROVIDER NOTE - NSFOLLOWUPCLINICS_GEN_ALL_ED_FT
[facility]  Internal Medicine  [address] [place] [address]  Phone: [phone]  Fax: [phone]  Follow Up Time:

## 2021-03-25 NOTE — ED PROVIDER NOTE - PHYSICAL EXAMINATION
PHYSICAL EXAM:  GENERAL: Sitting comfortable in bed, in no acute distress  HENMT: Atraumatic, moist mucous membranes, (+) right sided oropharyngeal swelling, no oropharyngeal exudates or vesicles, uvula is midline, no active bleeding, no obvious abscess or hematoma. No stridor, no drooling, EYES: Clear bilaterally, PERRL, EOMs intact b/l  HEART: RRR, S1/S2, no murmur/gallops/rubs  RESPIRATORY: Clear to auscultation bilaterally, no wheezes/rhonchi/rales  ABDOMEN: +BS, soft, nontender, nondistended  EXTREMITIES: No lower extremity edema, +2 radial pulses b/l  NEURO:  A&Ox4, no focal motor deficits or sensory deficits   Heme/LYMPH: No ecchymosis or bruising, no anterior/posterior cervical or supraclavicular LAD  SKIN:  Skin normal color for race, warm, dry and intact. No evidence of rash.

## 2021-03-25 NOTE — ED PROVIDER NOTE - NSFOLLOWUPINSTRUCTIONS_ED_ALL_ED_FT
You were seen in the Emergency Department for oral pain and swelling after having your wisdom tooth removed. You had no signs of infection on bloodwork or exam and your pain and swelling improved with medication given in the ED.    1) Advance activity as tolerated. Continue to advance diet as you tolerate from liquids to soft solids to solids  2) Continue all previously prescribed medications as directed.    3) Follow up with your oral surgeon and/or PCP in 24-48 hours - take copies of your results.    4) Return to the Emergency Department for worsening or persistent symptoms, and/or ANY NEW OR CONCERNING SYMPTOMS, including but not limited to: fevers, chills, chest pain, difficulty breathing, difficulty swallowing liquids, inability to tolerate your own saliva, drooling.

## 2022-12-07 ENCOUNTER — EMERGENCY (EMERGENCY)
Facility: HOSPITAL | Age: 32
LOS: 1 days | Discharge: HOME | End: 2022-12-07
Admitting: STUDENT IN AN ORGANIZED HEALTH CARE EDUCATION/TRAINING PROGRAM

## 2022-12-07 VITALS
OXYGEN SATURATION: 99 % | DIASTOLIC BLOOD PRESSURE: 71 MMHG | RESPIRATION RATE: 16 BRPM | HEART RATE: 80 BPM | TEMPERATURE: 98 F | SYSTOLIC BLOOD PRESSURE: 99 MMHG

## 2022-12-07 VITALS
HEART RATE: 94 BPM | OXYGEN SATURATION: 99 % | SYSTOLIC BLOOD PRESSURE: 109 MMHG | DIASTOLIC BLOOD PRESSURE: 53 MMHG | RESPIRATION RATE: 16 BRPM | TEMPERATURE: 98 F

## 2022-12-07 PROCEDURE — 99284 EMERGENCY DEPT VISIT MOD MDM: CPT

## 2022-12-07 RX ORDER — LIDOCAINE 4 G/100G
1 CREAM TOPICAL ONCE
Refills: 0 | Status: COMPLETED | OUTPATIENT
Start: 2022-12-07 | End: 2022-12-07

## 2022-12-07 RX ORDER — DIAZEPAM 5 MG
5 TABLET ORAL ONCE
Refills: 0 | Status: DISCONTINUED | OUTPATIENT
Start: 2022-12-07 | End: 2022-12-07

## 2022-12-07 RX ORDER — OXYCODONE AND ACETAMINOPHEN 5; 325 MG/1; MG/1
1 TABLET ORAL
Qty: 9 | Refills: 0
Start: 2022-12-07 | End: 2022-12-09

## 2022-12-07 RX ORDER — ACETAMINOPHEN 500 MG
650 TABLET ORAL ONCE
Refills: 0 | Status: COMPLETED | OUTPATIENT
Start: 2022-12-07 | End: 2022-12-07

## 2022-12-07 RX ADMIN — Medication 650 MILLIGRAM(S): at 17:12

## 2022-12-07 RX ADMIN — Medication 5 MILLIGRAM(S): at 17:12

## 2022-12-07 RX ADMIN — LIDOCAINE 1 PATCH: 4 CREAM TOPICAL at 17:12

## 2022-12-07 NOTE — ED PROVIDER NOTE - NS CPE EDP MUSC LUMBAR LOC
no midline spine tenderness, +right paraspinal tenderness, no erythema, no edema, no obvious deformity b/l.

## 2022-12-07 NOTE — ED PROVIDER NOTE - EXTREMITY EXAM
limited ROM of right lower leg due to back pain, sensation intact b/l, strength 5/5 b/l, no foot drop b/l.

## 2022-12-07 NOTE — ED PROVIDER NOTE - PATIENT PORTAL LINK FT
You can access the FollowMyHealth Patient Portal offered by [facility] by registering at the following website: [email or web address]. By joining [email or web address]’s FollowMyHealth portal, you will also be able to view your health information using other applications (apps) compatible with our system.

## 2022-12-07 NOTE — ED PROVIDER NOTE - NSFOLLOWUPINSTRUCTIONS_ED_ALL_ED_FT
Follow with your PMD within 48-72 hours.  Rest, no heavy lifting.  Warm compresses to area.  Light walking. Continue previously prescribed pain medication.  Percocet 1 tablet every 8 hrs as needed for breakthrough discomfort- caution drowsiness while taking this medication- do not drive or operate heavy machinery. Any worsening pain, weakness, numbness, bowel or urinary incontinence or new concerning symptoms return to the Emergency Department.
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
## 2022-12-07 NOTE — ED ADULT NURSE NOTE - OBJECTIVE STATEMENT
patient arrives to the ER A&Ox4, ambulatory, c/o chronic lower back pain, states pain is worsening over past 2 weeks. patient states she has an appointment with pain management doctor next week. no acute distress noted.

## 2022-12-07 NOTE — ED PROVIDER NOTE - PROGRESS NOTE DETAILS
PA ANDREW: Patient reassessed, sitting comfortably in chair in NAD, denies any complaints. States feeling better, symptoms improved. Pt is medically stable for discharge and follow up with PMD and ortho. The patient was given verbal and written discharge instructions. Specifically, instructions when to return to the ED and when to seek follow-up from their pcp was discussed. Any specialty follow-up was discussed, including how to make an appointment.  Instructions were discussed in simple, plain language and was understood by the patient. The patient understands that should their symptoms worsen or any new symptoms arise, they should return to the ED immediately for further evaluation. All pt's questions were answered. Patient verbalizes understanding.

## 2022-12-07 NOTE — ED PROVIDER NOTE - CLINICAL SUMMARY MEDICAL DECISION MAKING FREE TEXT BOX
worsening on chronic lower back pain w/ RLE radiculopathy. MRI last week with disc herniation impingement of L5/S1. pending orthopedic evaluation next week. has pain management. pain not controlled with NSAIDS and Robaxin prior to ED visit. No red flags. Plan: pain control and reassess.

## 2022-12-07 NOTE — ED ADULT TRIAGE NOTE - CHIEF COMPLAINT QUOTE
pt with chronic lower back pain, states pain is worsening over past 2 weeks. states she has an appointment with pain management doctor next week. pt denies injury.

## 2023-04-26 PROBLEM — Z00.00 ENCOUNTER FOR PREVENTIVE HEALTH EXAMINATION: Status: ACTIVE | Noted: 2023-04-26

## 2023-04-27 ENCOUNTER — APPOINTMENT (OUTPATIENT)
Dept: MRI IMAGING | Facility: CLINIC | Age: 33
End: 2023-04-27
Payer: MEDICAID

## 2023-04-27 PROCEDURE — 70553 MRI BRAIN STEM W/O & W/DYE: CPT

## 2023-04-27 PROCEDURE — A9585: CPT | Mod: JW

## 2023-04-27 PROCEDURE — A9585: CPT

## 2023-04-27 PROCEDURE — 72141 MRI NECK SPINE W/O DYE: CPT

## 2023-10-31 ENCOUNTER — APPOINTMENT (OUTPATIENT)
Dept: MRI IMAGING | Facility: CLINIC | Age: 33
End: 2023-10-31
Payer: MEDICAID

## 2023-10-31 PROCEDURE — 72148 MRI LUMBAR SPINE W/O DYE: CPT

## 2024-09-05 ENCOUNTER — OUTPATIENT (OUTPATIENT)
Dept: OUTPATIENT SERVICES | Facility: HOSPITAL | Age: 34
LOS: 1 days | End: 2024-09-05
Payer: MEDICAID

## 2024-09-05 ENCOUNTER — APPOINTMENT (OUTPATIENT)
Dept: MRI IMAGING | Facility: HOSPITAL | Age: 34
End: 2024-09-05

## 2024-09-05 DIAGNOSIS — M47.816 SPONDYLOSIS WITHOUT MYELOPATHY OR RADICULOPATHY, LUMBAR REGION: ICD-10-CM

## 2024-09-05 PROCEDURE — 72148 MRI LUMBAR SPINE W/O DYE: CPT | Mod: 26

## 2024-09-05 PROCEDURE — 72148 MRI LUMBAR SPINE W/O DYE: CPT

## 2024-09-25 NOTE — ED ADULT NURSE NOTE - PAIN: PRESENCE, MLM
Spoke with patient and notified Dr. Santiago reviewed blood work and conveyed recommendations noted below.    Patient states he recently started with chest congestion, cough, cloudy urine, burning with urination and foul odor. Patient is bringing up white to greenish phlegm. Patient denies fever, chills, chest pain, sob, urgency, frequency, pressure, hematuria.   complains of pain/discomfort

## 2024-09-30 ENCOUNTER — OUTPATIENT (OUTPATIENT)
Dept: OUTPATIENT SERVICES | Facility: HOSPITAL | Age: 34
LOS: 1 days | End: 2024-09-30
Payer: MEDICAID

## 2024-09-30 ENCOUNTER — APPOINTMENT (OUTPATIENT)
Dept: CT IMAGING | Facility: IMAGING CENTER | Age: 34
End: 2024-09-30
Payer: MEDICAID

## 2024-09-30 VITALS
OXYGEN SATURATION: 100 % | WEIGHT: 162.92 LBS | HEIGHT: 64 IN | TEMPERATURE: 98 F | DIASTOLIC BLOOD PRESSURE: 67 MMHG | RESPIRATION RATE: 18 BRPM | SYSTOLIC BLOOD PRESSURE: 107 MMHG | HEART RATE: 63 BPM

## 2024-09-30 DIAGNOSIS — Z98.51 TUBAL LIGATION STATUS: Chronic | ICD-10-CM

## 2024-09-30 DIAGNOSIS — M47.816 SPONDYLOSIS WITHOUT MYELOPATHY OR RADICULOPATHY, LUMBAR REGION: ICD-10-CM

## 2024-09-30 DIAGNOSIS — Z90.3 ACQUIRED ABSENCE OF STOMACH [PART OF]: Chronic | ICD-10-CM

## 2024-09-30 DIAGNOSIS — M47.9 SPONDYLOSIS, UNSPECIFIED: ICD-10-CM

## 2024-09-30 DIAGNOSIS — Z01.818 ENCOUNTER FOR OTHER PREPROCEDURAL EXAMINATION: ICD-10-CM

## 2024-09-30 DIAGNOSIS — Z98.890 OTHER SPECIFIED POSTPROCEDURAL STATES: Chronic | ICD-10-CM

## 2024-09-30 DIAGNOSIS — Z29.9 ENCOUNTER FOR PROPHYLACTIC MEASURES, UNSPECIFIED: ICD-10-CM

## 2024-09-30 LAB
A1C WITH ESTIMATED AVERAGE GLUCOSE RESULT: 5.5 % — SIGNIFICANT CHANGE UP (ref 4–5.6)
ANION GAP SERPL CALC-SCNC: 15 MMOL/L — SIGNIFICANT CHANGE UP (ref 5–17)
BLD GP AB SCN SERPL QL: NEGATIVE — SIGNIFICANT CHANGE UP
BUN SERPL-MCNC: 13 MG/DL — SIGNIFICANT CHANGE UP (ref 7–23)
CALCIUM SERPL-MCNC: 9.5 MG/DL — SIGNIFICANT CHANGE UP (ref 8.4–10.5)
CHLORIDE SERPL-SCNC: 105 MMOL/L — SIGNIFICANT CHANGE UP (ref 96–108)
CO2 SERPL-SCNC: 20 MMOL/L — LOW (ref 22–31)
CREAT SERPL-MCNC: 0.74 MG/DL — SIGNIFICANT CHANGE UP (ref 0.5–1.3)
EGFR: 108 ML/MIN/1.73M2 — SIGNIFICANT CHANGE UP
ESTIMATED AVERAGE GLUCOSE: 111 MG/DL — SIGNIFICANT CHANGE UP (ref 68–114)
GLUCOSE SERPL-MCNC: 99 MG/DL — SIGNIFICANT CHANGE UP (ref 70–99)
HCT VFR BLD CALC: 34.5 % — SIGNIFICANT CHANGE UP (ref 34.5–45)
HGB BLD-MCNC: 10.8 G/DL — LOW (ref 11.5–15.5)
MCHC RBC-ENTMCNC: 25.5 PG — LOW (ref 27–34)
MCHC RBC-ENTMCNC: 31.3 GM/DL — LOW (ref 32–36)
MCV RBC AUTO: 81.6 FL — SIGNIFICANT CHANGE UP (ref 80–100)
NRBC # BLD: 0 /100 WBCS — SIGNIFICANT CHANGE UP (ref 0–0)
PLATELET # BLD AUTO: 328 K/UL — SIGNIFICANT CHANGE UP (ref 150–400)
POTASSIUM SERPL-MCNC: 4.5 MMOL/L — SIGNIFICANT CHANGE UP (ref 3.5–5.3)
POTASSIUM SERPL-SCNC: 4.5 MMOL/L — SIGNIFICANT CHANGE UP (ref 3.5–5.3)
RBC # BLD: 4.23 M/UL — SIGNIFICANT CHANGE UP (ref 3.8–5.2)
RBC # FLD: 16 % — HIGH (ref 10.3–14.5)
RH IG SCN BLD-IMP: NEGATIVE — SIGNIFICANT CHANGE UP
SODIUM SERPL-SCNC: 140 MMOL/L — SIGNIFICANT CHANGE UP (ref 135–145)
WBC # BLD: 5.13 K/UL — SIGNIFICANT CHANGE UP (ref 3.8–10.5)
WBC # FLD AUTO: 5.13 K/UL — SIGNIFICANT CHANGE UP (ref 3.8–10.5)

## 2024-09-30 PROCEDURE — 85027 COMPLETE CBC AUTOMATED: CPT

## 2024-09-30 PROCEDURE — 80048 BASIC METABOLIC PNL TOTAL CA: CPT

## 2024-09-30 PROCEDURE — 86901 BLOOD TYPING SEROLOGIC RH(D): CPT

## 2024-09-30 PROCEDURE — 86900 BLOOD TYPING SEROLOGIC ABO: CPT

## 2024-09-30 PROCEDURE — 86850 RBC ANTIBODY SCREEN: CPT

## 2024-09-30 PROCEDURE — G0463: CPT

## 2024-09-30 PROCEDURE — 87640 STAPH A DNA AMP PROBE: CPT

## 2024-09-30 PROCEDURE — 72131 CT LUMBAR SPINE W/O DYE: CPT | Mod: 26

## 2024-09-30 PROCEDURE — 83036 HEMOGLOBIN GLYCOSYLATED A1C: CPT

## 2024-09-30 PROCEDURE — 87641 MR-STAPH DNA AMP PROBE: CPT

## 2024-09-30 PROCEDURE — 72131 CT LUMBAR SPINE W/O DYE: CPT

## 2024-09-30 RX ORDER — VANCOMYCIN HYDROCHLORIDE 50 MG/ML
1000 KIT ORAL ONCE
Refills: 0 | Status: DISCONTINUED | OUTPATIENT
Start: 2024-10-24 | End: 2024-10-26

## 2024-09-30 NOTE — H&P PST ADULT - NSICDXPASTSURGICALHX_GEN_ALL_CORE_FT
PAST SURGICAL HISTORY:  H/O gastric sleeve     H/O shoulder surgery     H/O tubal ligation     History of cholecystectomy

## 2024-09-30 NOTE — H&P PST ADULT - NSANTHOSAYNRD_GEN_A_CORE
No. SLOAN screening performed.  STOP BANG Legend: 0-2 = LOW Risk; 3-4 = INTERMEDIATE Risk; 5-8 = HIGH Risk
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
## 2024-09-30 NOTE — H&P PST ADULT - NEGATIVE GENERAL GENITOURINARY SYMPTOMS
no hematuria/no renal colic/no flank pain L/no flank pain R/no incontinence/no urinary hesitancy/normal urinary frequency

## 2024-09-30 NOTE — H&P PST ADULT - PROBLEM SELECTOR PLAN 1
educated on instructions  Chlorhexidine and benzoyl peroxide wash given to patient and instructions   ERP instructions given to patient and verbalizes understanding  Labs in PST: CBC BMP A1C MRSA T&S  Day of: Pregnancy ABO confirm FS

## 2024-09-30 NOTE — H&P PST ADULT - IN ACCORDANCE WITH [place] STATE LAW, WE OFFER EVERY PATIENT A HEPATITIS C TEST. WOULD YOU LIKE TO BE TESTED TODAY?
Yoan Xavier age 78 *LLE WBAT     Dx: left femoral neck fracture s/p left hip hemiarthroplasty 9/21/2022. Pt. Has a history of HTN and HLD.*See chart for full and complete medical history*     Hx mental health/substance abuse: Pt. Reports history of depression a long time ago but not recently. She is a smoker.      Is there evidence of an acute change in mental status from the patients baseline? No     Insurance: Medicare Part A&B/ [employer]     Education//Work Hx: Pt. Completed high school. No  history. 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      Pt. Is single. Never . She lives alone in assisted living and is very independent     Children (0)/Friends/Family: 1) Namrata Yu, sister ([phone]) 2) Lex Yu, brother-in-law ([phone])     Power of  (no)/Living Will (no)      Prior Level of Fx: Independent ADLs, drives, cooks small meals, does chores, grocery shops, manages finances, and manages own meds. She does not have a medic alert.     Residence: Pt. Lives alone at [facility]. No steps to enter. Pt. Is unsure if it is a tub/shower or walk-in shower. She states that it has a HHS and grab bars. She has an elevated toilet with grab bars.      DME: RW. Will use [facility] for DME.     HH/OP tx: No history of HH. Will use [facility] for HH     Pharmacy: [facility] / (has prescription drug coverage)      FOC was obtained for [facility] for DME and [facility] for HH     MD(s): PCP: Dr. Ronal Zuluaga ([phone])    Opt out

## 2024-09-30 NOTE — H&P PST ADULT - ASSESSMENT
CAPRINI SCORE [CLOT]    AGE RELATED RISK FACTORS                                                       MOBILITY RELATED FACTORS  [ ] Age 41-60 years                                            (1 Point)                  [ ] Bed rest                                                        (1 Point)  [ ] Age: 61-74 years                                           (2 Points)                 [ ] Plaster cast                                                   (2 Points)  [ ] Age= 75 years                                              (3 Points)                 [ ] Bed bound for more than 72 hours                 (2 Points)    DISEASE RELATED RISK FACTORS                                               GENDER SPECIFIC FACTORS  [ ] Edema in the lower extremities                       (1 Point)                  [ ] Pregnancy                                                     (1 Point)  [ ] Varicose veins                                               (1 Point)                  [ ] Post-partum < 6 weeks                                   (1 Point)             [x ] BMI > 25 Kg/m2                                            (1 Point)                  [ ] Hormonal therapy  or oral contraception          (1 Point)                 [ ] Sepsis (in the previous month)                        (1 Point)                  [ ] History of pregnancy complications                 (1 point)  [ ] Pneumonia or serious lung disease                                               [ ] Unexplained or recurrent                     (1 Point)           (in the previous month)                               (1 Point)  [ ] Abnormal pulmonary function test                     (1 Point)                 SURGERY RELATED RISK FACTORS  [ ] Acute myocardial infarction                              (1 Point)                 [ ]  Section                                             (1 Point)  [ ] Congestive heart failure (in the previous month)  (1 Point)               [ ] Minor surgery                                                  (1 Point)   [ ] Inflammatory bowel disease                             (1 Point)                 [ ] Arthroscopic surgery                                        (2 Points)  [ ] Central venous access                                      (2 Points)                [x ] General surgery lasting more than 45 minutes   (2 Points)       [ ] Stroke (in the previous month)                          (5 Points)               [ ] Elective arthroplasty                                         (5 Points)                                                                                                                                               HEMATOLOGY RELATED FACTORS                                                 TRAUMA RELATED RISK FACTORS  [ ] Prior episodes of VTE                                     (3 Points)                [ ] Fracture of the hip, pelvis, or leg                       (5 Points)  [ ] Positive family history for VTE                         (3 Points)                 [ ] Acute spinal cord injury (in the previous month)  (5 Points)  [ ] Prothrombin 19804 A                                     (3 Points)                 [ ] Paralysis  (less than 1 month)                             (5 Points)  [ ] Factor V Leiden                                             (3 Points)                  [ ] Multiple Trauma within 1 month                        (5 Points)  [ ] Lupus anticoagulants                                     (3 Points)                                                           [ ] Anticardiolipin antibodies                               (3 Points)                                                       [ ] High homocysteine in the blood                      (3 Points)                                             [ ] Other congenital or acquired thrombophilia      (3 Points)                                                [ ] Heparin induced thrombocytopenia                  (3 Points)                                          Total Score [    3      ]    Caprini Score 0 - 2:  Low Risk, No VTE Prophylaxis required for most patients, encourage ambulation  Caprini Score 3 - 6:  At Risk, pharmacologic VTE prophylaxis is indicated for most patients (in the absence of a contraindication)  Caprini Score Greater than or = 7:  High Risk, pharmacologic VTE prophylaxis is indicated for most patients (in the absence of a contraindication)

## 2024-09-30 NOTE — H&P PST ADULT - ADDITIONAL PE
DASI score: 9.89  DASI activity: walks a lot with work, denies any cardiac symptoms   Loose teeth or denture: denies

## 2024-10-01 LAB
MRSA PCR RESULT.: SIGNIFICANT CHANGE UP
S AUREUS DNA NOSE QL NAA+PROBE: SIGNIFICANT CHANGE UP

## 2024-10-24 ENCOUNTER — INPATIENT (INPATIENT)
Facility: HOSPITAL | Age: 34
LOS: 7 days | Discharge: HOME | DRG: 552 | End: 2024-11-01
Attending: NEUROLOGICAL SURGERY | Admitting: NEUROLOGICAL SURGERY
Payer: MEDICAID

## 2024-10-24 ENCOUNTER — TRANSCRIPTION ENCOUNTER (OUTPATIENT)
Age: 34
End: 2024-10-24

## 2024-10-24 VITALS
OXYGEN SATURATION: 98 % | TEMPERATURE: 98 F | WEIGHT: 162.92 LBS | DIASTOLIC BLOOD PRESSURE: 69 MMHG | SYSTOLIC BLOOD PRESSURE: 101 MMHG | HEIGHT: 64 IN | HEART RATE: 71 BPM | RESPIRATION RATE: 17 BRPM

## 2024-10-24 DIAGNOSIS — M47.816 SPONDYLOSIS WITHOUT MYELOPATHY OR RADICULOPATHY, LUMBAR REGION: ICD-10-CM

## 2024-10-24 DIAGNOSIS — Z98.51 TUBAL LIGATION STATUS: Chronic | ICD-10-CM

## 2024-10-24 DIAGNOSIS — Z90.3 ACQUIRED ABSENCE OF STOMACH [PART OF]: Chronic | ICD-10-CM

## 2024-10-24 DIAGNOSIS — Z98.890 OTHER SPECIFIED POSTPROCEDURAL STATES: Chronic | ICD-10-CM

## 2024-10-24 LAB
ANION GAP SERPL CALC-SCNC: 11 MMOL/L — SIGNIFICANT CHANGE UP (ref 5–17)
BASOPHILS # BLD AUTO: 0 K/UL — SIGNIFICANT CHANGE UP (ref 0–0.2)
BASOPHILS NFR BLD AUTO: 0 % — SIGNIFICANT CHANGE UP (ref 0–2)
BUN SERPL-MCNC: 10 MG/DL — SIGNIFICANT CHANGE UP (ref 7–23)
CALCIUM SERPL-MCNC: 8 MG/DL — LOW (ref 8.4–10.5)
CHLORIDE SERPL-SCNC: 106 MMOL/L — SIGNIFICANT CHANGE UP (ref 96–108)
CO2 SERPL-SCNC: 21 MMOL/L — LOW (ref 22–31)
CREAT SERPL-MCNC: 0.63 MG/DL — SIGNIFICANT CHANGE UP (ref 0.5–1.3)
EGFR: 119 ML/MIN/1.73M2 — SIGNIFICANT CHANGE UP
EOSINOPHIL # BLD AUTO: 0 K/UL — SIGNIFICANT CHANGE UP (ref 0–0.5)
EOSINOPHIL NFR BLD AUTO: 0 % — SIGNIFICANT CHANGE UP (ref 0–6)
GLUCOSE SERPL-MCNC: 135 MG/DL — HIGH (ref 70–99)
HCG UR QL: NEGATIVE — SIGNIFICANT CHANGE UP
HCT VFR BLD CALC: 28.6 % — LOW (ref 34.5–45)
HGB BLD-MCNC: 9.1 G/DL — LOW (ref 11.5–15.5)
IMM GRANULOCYTES NFR BLD AUTO: 0.4 % — SIGNIFICANT CHANGE UP (ref 0–0.9)
LYMPHOCYTES # BLD AUTO: 0.5 K/UL — LOW (ref 1–3.3)
LYMPHOCYTES # BLD AUTO: 5 % — LOW (ref 13–44)
MCHC RBC-ENTMCNC: 26.1 PG — LOW (ref 27–34)
MCHC RBC-ENTMCNC: 31.8 GM/DL — LOW (ref 32–36)
MCV RBC AUTO: 82.2 FL — SIGNIFICANT CHANGE UP (ref 80–100)
MONOCYTES # BLD AUTO: 0.07 K/UL — SIGNIFICANT CHANGE UP (ref 0–0.9)
MONOCYTES NFR BLD AUTO: 0.7 % — LOW (ref 2–14)
NEUTROPHILS # BLD AUTO: 9.34 K/UL — HIGH (ref 1.8–7.4)
NEUTROPHILS NFR BLD AUTO: 93.9 % — HIGH (ref 43–77)
NRBC # BLD: 0 /100 WBCS — SIGNIFICANT CHANGE UP (ref 0–0)
PLATELET # BLD AUTO: 261 K/UL — SIGNIFICANT CHANGE UP (ref 150–400)
POTASSIUM SERPL-MCNC: 4.1 MMOL/L — SIGNIFICANT CHANGE UP (ref 3.5–5.3)
POTASSIUM SERPL-SCNC: 4.1 MMOL/L — SIGNIFICANT CHANGE UP (ref 3.5–5.3)
RBC # BLD: 3.48 M/UL — LOW (ref 3.8–5.2)
RBC # FLD: 16.6 % — HIGH (ref 10.3–14.5)
SODIUM SERPL-SCNC: 138 MMOL/L — SIGNIFICANT CHANGE UP (ref 135–145)
WBC # BLD: 9.95 K/UL — SIGNIFICANT CHANGE UP (ref 3.8–10.5)
WBC # FLD AUTO: 9.95 K/UL — SIGNIFICANT CHANGE UP (ref 3.8–10.5)

## 2024-10-24 DEVICE — ROD CURV 5.5X40MM: Type: IMPLANTABLE DEVICE | Status: FUNCTIONAL

## 2024-10-24 DEVICE — SCREW SET: Type: IMPLANTABLE DEVICE | Status: FUNCTIONAL

## 2024-10-24 DEVICE — SCREW SOLERA 5.5X40MM: Type: IMPLANTABLE DEVICE | Status: FUNCTIONAL

## 2024-10-24 DEVICE — SURGIFLO MATRIX WITH THROMBIN KIT: Type: IMPLANTABLE DEVICE | Status: FUNCTIONAL

## 2024-10-24 DEVICE — SCREW MAS 6.5X40MM: Type: IMPLANTABLE DEVICE | Status: FUNCTIONAL

## 2024-10-24 DEVICE — SCREW MAS 5.5X50MM: Type: IMPLANTABLE DEVICE | Status: FUNCTIONAL

## 2024-10-24 DEVICE — SCREW MAS 6.5X50MM: Type: IMPLANTABLE DEVICE | Status: FUNCTIONAL

## 2024-10-24 DEVICE — SURGIFOAM PAD 8CM X 12.5CM X 10MM (100): Type: IMPLANTABLE DEVICE | Status: FUNCTIONAL

## 2024-10-24 DEVICE — SPACER SABLE 15 DEG 10X22X7-13MM: Type: IMPLANTABLE DEVICE | Status: FUNCTIONAL

## 2024-10-24 DEVICE — BONE FILLER BIOCOMPOSITE STIMULAN RAPID CURE 10CC: Type: IMPLANTABLE DEVICE | Status: FUNCTIONAL

## 2024-10-24 RX ORDER — SENNA 187 MG
2 TABLET ORAL AT BEDTIME
Refills: 0 | Status: DISCONTINUED | OUTPATIENT
Start: 2024-10-24 | End: 2024-11-01

## 2024-10-24 RX ORDER — ENOXAPARIN SODIUM 40MG/0.4ML
40 SYRINGE (ML) SUBCUTANEOUS EVERY 24 HOURS
Refills: 0 | Status: DISCONTINUED | OUTPATIENT
Start: 2024-10-25 | End: 2024-10-29

## 2024-10-24 RX ORDER — INFLUENZ VIR VAC TV P-SURF2003 15MCG/.5ML
0.5 SYRINGE (ML) INTRAMUSCULAR ONCE
Refills: 0 | Status: DISCONTINUED | OUTPATIENT
Start: 2024-10-24 | End: 2024-10-30

## 2024-10-24 RX ORDER — LIDOCAINE HCL 60 MG/3 ML
0.2 SYRINGE (ML) INJECTION ONCE
Refills: 0 | Status: DISCONTINUED | OUTPATIENT
Start: 2024-10-24 | End: 2024-10-24

## 2024-10-24 RX ORDER — ONDANSETRON HYDROCHLORIDE 2 MG/ML
4 INJECTION, SOLUTION INTRAMUSCULAR; INTRAVENOUS ONCE
Refills: 0 | Status: DISCONTINUED | OUTPATIENT
Start: 2024-10-24 | End: 2024-10-24

## 2024-10-24 RX ORDER — ONDANSETRON HYDROCHLORIDE 2 MG/ML
4 INJECTION, SOLUTION INTRAMUSCULAR; INTRAVENOUS EVERY 6 HOURS
Refills: 0 | Status: DISCONTINUED | OUTPATIENT
Start: 2024-10-24 | End: 2024-10-29

## 2024-10-24 RX ORDER — NALBUPHINE HCL 100 %
2.5 POWDER (GRAM) MISCELLANEOUS EVERY 6 HOURS
Refills: 0 | Status: DISCONTINUED | OUTPATIENT
Start: 2024-10-24 | End: 2024-10-29

## 2024-10-24 RX ORDER — POLYETHYLENE GLYCOL 3350 17 G/17G
17 POWDER, FOR SOLUTION ORAL DAILY
Refills: 0 | Status: DISCONTINUED | OUTPATIENT
Start: 2024-10-24 | End: 2024-10-26

## 2024-10-24 RX ORDER — OXYCODONE HYDROCHLORIDE 30 MG/1
10 TABLET ORAL EVERY 4 HOURS
Refills: 0 | Status: DISCONTINUED | OUTPATIENT
Start: 2024-10-24 | End: 2024-10-25

## 2024-10-24 RX ORDER — DIPHENHYDRAMINE HCL 12.5MG/5ML
25 ELIXIR ORAL EVERY 6 HOURS
Refills: 0 | Status: DISCONTINUED | OUTPATIENT
Start: 2024-10-24 | End: 2024-10-29

## 2024-10-24 RX ORDER — CALAMINE
1 LOTION (ML) TOPICAL EVERY 6 HOURS
Refills: 0 | Status: DISCONTINUED | OUTPATIENT
Start: 2024-10-24 | End: 2024-11-01

## 2024-10-24 RX ORDER — SODIUM CHLORIDE 9 MG/ML
3 INJECTION, SOLUTION INTRAMUSCULAR; INTRAVENOUS; SUBCUTANEOUS EVERY 8 HOURS
Refills: 0 | Status: DISCONTINUED | OUTPATIENT
Start: 2024-10-24 | End: 2024-10-24

## 2024-10-24 RX ORDER — HYDROMORPHONE HCL/0.9% NACL/PF 6 MG/30 ML
30 PATIENT CONTROLLED ANALGESIA SYRINGE INTRAVENOUS
Refills: 0 | Status: DISCONTINUED | OUTPATIENT
Start: 2024-10-24 | End: 2024-10-27

## 2024-10-24 RX ORDER — ACETAMINOPHEN 500 MG
1000 TABLET ORAL EVERY 8 HOURS
Refills: 0 | Status: DISCONTINUED | OUTPATIENT
Start: 2024-10-24 | End: 2024-10-25

## 2024-10-24 RX ORDER — APREPITANT 40 MG/1
40 CAPSULE ORAL ONCE
Refills: 0 | Status: COMPLETED | OUTPATIENT
Start: 2024-10-24 | End: 2024-10-24

## 2024-10-24 RX ORDER — METHOCARBAMOL 500 MG/1
500 TABLET ORAL EVERY 8 HOURS
Refills: 0 | Status: DISCONTINUED | OUTPATIENT
Start: 2024-10-24 | End: 2024-10-25

## 2024-10-24 RX ORDER — OXYCODONE HYDROCHLORIDE 30 MG/1
5 TABLET ORAL ONCE
Refills: 0 | Status: DISCONTINUED | OUTPATIENT
Start: 2024-10-24 | End: 2024-10-24

## 2024-10-24 RX ORDER — GABAPENTIN 300 MG/1
200 CAPSULE ORAL THREE TIMES A DAY
Refills: 0 | Status: DISCONTINUED | OUTPATIENT
Start: 2024-10-24 | End: 2024-10-26

## 2024-10-24 RX ORDER — CEFAZOLIN SODIUM 1 G
2000 VIAL (EA) INJECTION EVERY 8 HOURS
Refills: 0 | Status: COMPLETED | OUTPATIENT
Start: 2024-10-24 | End: 2024-10-25

## 2024-10-24 RX ORDER — HYDROMORPHONE HCL/0.9% NACL/PF 6 MG/30 ML
0.5 PATIENT CONTROLLED ANALGESIA SYRINGE INTRAVENOUS
Refills: 0 | Status: DISCONTINUED | OUTPATIENT
Start: 2024-10-24 | End: 2024-10-24

## 2024-10-24 RX ORDER — ACETAMINOPHEN 500 MG
1000 TABLET ORAL ONCE
Refills: 0 | Status: COMPLETED | OUTPATIENT
Start: 2024-10-24 | End: 2024-10-24

## 2024-10-24 RX ORDER — CHLORHEXIDINE GLUCONATE 40 MG/ML
1 SOLUTION TOPICAL DAILY
Refills: 0 | Status: DISCONTINUED | OUTPATIENT
Start: 2024-10-24 | End: 2024-10-24

## 2024-10-24 RX ORDER — ONDANSETRON HYDROCHLORIDE 2 MG/ML
4 INJECTION, SOLUTION INTRAMUSCULAR; INTRAVENOUS EVERY 6 HOURS
Refills: 0 | Status: DISCONTINUED | OUTPATIENT
Start: 2024-10-24 | End: 2024-10-26

## 2024-10-24 RX ORDER — NALOXONE HYDROCHLORIDE 1 MG/ML
0.1 INJECTION, SOLUTION INTRAMUSCULAR; INTRAVENOUS; SUBCUTANEOUS
Refills: 0 | Status: DISCONTINUED | OUTPATIENT
Start: 2024-10-24 | End: 2024-10-29

## 2024-10-24 RX ORDER — HYDROMORPHONE HCL/0.9% NACL/PF 6 MG/30 ML
0.5 PATIENT CONTROLLED ANALGESIA SYRINGE INTRAVENOUS
Refills: 0 | Status: DISCONTINUED | OUTPATIENT
Start: 2024-10-24 | End: 2024-10-27

## 2024-10-24 RX ADMIN — Medication 30 MILLILITER(S): at 14:45

## 2024-10-24 RX ADMIN — METHOCARBAMOL 500 MILLIGRAM(S): 500 TABLET ORAL at 21:11

## 2024-10-24 RX ADMIN — Medication 0.5 MILLIGRAM(S): at 14:30

## 2024-10-24 RX ADMIN — Medication 0.5 MILLIGRAM(S): at 23:51

## 2024-10-24 RX ADMIN — Medication 0.5 MILLIGRAM(S): at 14:10

## 2024-10-24 RX ADMIN — Medication 1000 MILLILITER(S): at 16:59

## 2024-10-24 RX ADMIN — Medication 1000 MILLIGRAM(S): at 07:25

## 2024-10-24 RX ADMIN — Medication 30 MILLILITER(S): at 18:56

## 2024-10-24 RX ADMIN — Medication 0.5 MILLIGRAM(S): at 13:35

## 2024-10-24 RX ADMIN — Medication 0.5 MILLIGRAM(S): at 13:55

## 2024-10-24 RX ADMIN — Medication 30 MILLILITER(S): at 19:42

## 2024-10-24 RX ADMIN — Medication 0.5 MILLIGRAM(S): at 13:50

## 2024-10-24 RX ADMIN — Medication 2 TABLET(S): at 21:11

## 2024-10-24 RX ADMIN — Medication 100 MILLIGRAM(S): at 21:10

## 2024-10-24 RX ADMIN — Medication 75 MILLILITER(S): at 16:59

## 2024-10-24 RX ADMIN — APREPITANT 40 MILLIGRAM(S): 40 CAPSULE ORAL at 07:24

## 2024-10-24 RX ADMIN — Medication 25 MILLIGRAM(S): at 21:11

## 2024-10-24 RX ADMIN — GABAPENTIN 200 MILLIGRAM(S): 300 CAPSULE ORAL at 21:11

## 2024-10-24 RX ADMIN — Medication 0.5 MILLIGRAM(S): at 15:00

## 2024-10-24 NOTE — BRIEF OPERATIVE NOTE - NSICDXBRIEFPROCEDURE_GEN_ALL_CORE_FT
PROCEDURES:  Fusion, spine, thoracic or lumbar, posterior approach 24-Oct-2024 13:52:29  Jefry Mccann

## 2024-10-24 NOTE — PHYSICAL THERAPY INITIAL EVALUATION ADULT - PLANNED THERAPY INTERVENTIONS, PT EVAL
1. GOAL: In 3 weeks, pt will be able to navigate 8 steps independently./bed mobility training/gait training/transfer training

## 2024-10-24 NOTE — PHYSICAL THERAPY INITIAL EVALUATION ADULT - LIGHT TOUCH SENSATION, RLE, REHAB EVAL
Patient is scheduled for a Paragard IUD Insertion with Eileen on 4/26/24 @ 2pm.  Patient aware nurse will call to review instructions.  Pharmacy verified; Rx may have been sent by [facility]   within normal limits

## 2024-10-24 NOTE — PHYSICAL THERAPY INITIAL EVALUATION ADULT - NSPTDMEREC_GEN_A_CORE
TBD pending completion of functional evaluation Patient will require rolling walker at home due to their diagnosis of decreased strength to help complete MRADL's (mobility related activity for daily living)./rolling walker

## 2024-10-24 NOTE — PHYSICAL THERAPY INITIAL EVALUATION ADULT - ADDITIONAL COMMENTS
Pt resides in an apartment with spouse, 8 steps to enter (+HR), no additional steps inside. PTA pt was independent with all mobility & ADL's. Did not use an AD for ambulation.

## 2024-10-24 NOTE — PROGRESS NOTE ADULT - SUBJECTIVE AND OBJECTIVE BOX
Patient seen and examined at bedside s/p L5-S1 lami/fxn. Doing well. Awake KIM strong, DF/PF 5/5. Very anxious w L>R radic and LBP preop,    -PACU Floor, cleared after 4 hours.  -Q4 neurochecks  -Q4 vitals  -Pain control  -Advance diet as tolerated  -PT/OT
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
## 2024-10-25 LAB
ANION GAP SERPL CALC-SCNC: 9 MMOL/L — SIGNIFICANT CHANGE UP (ref 5–17)
BASOPHILS # BLD AUTO: 0.02 K/UL — SIGNIFICANT CHANGE UP (ref 0–0.2)
BASOPHILS NFR BLD AUTO: 0.2 % — SIGNIFICANT CHANGE UP (ref 0–2)
BUN SERPL-MCNC: 10 MG/DL — SIGNIFICANT CHANGE UP (ref 7–23)
CALCIUM SERPL-MCNC: 8.2 MG/DL — LOW (ref 8.4–10.5)
CHLORIDE SERPL-SCNC: 102 MMOL/L — SIGNIFICANT CHANGE UP (ref 96–108)
CO2 SERPL-SCNC: 23 MMOL/L — SIGNIFICANT CHANGE UP (ref 22–31)
CREAT SERPL-MCNC: 0.71 MG/DL — SIGNIFICANT CHANGE UP (ref 0.5–1.3)
EGFR: 114 ML/MIN/1.73M2 — SIGNIFICANT CHANGE UP
EOSINOPHIL # BLD AUTO: 0.02 K/UL — SIGNIFICANT CHANGE UP (ref 0–0.5)
EOSINOPHIL NFR BLD AUTO: 0.2 % — SIGNIFICANT CHANGE UP (ref 0–6)
GLUCOSE BLDC GLUCOMTR-MCNC: 77 MG/DL — SIGNIFICANT CHANGE UP (ref 70–99)
GLUCOSE SERPL-MCNC: 104 MG/DL — HIGH (ref 70–99)
HCT VFR BLD CALC: 26 % — LOW (ref 34.5–45)
HCT VFR BLD CALC: 26.3 % — LOW (ref 34.5–45)
HGB BLD-MCNC: 8.3 G/DL — LOW (ref 11.5–15.5)
HGB BLD-MCNC: 8.4 G/DL — LOW (ref 11.5–15.5)
IMM GRANULOCYTES NFR BLD AUTO: 0.5 % — SIGNIFICANT CHANGE UP (ref 0–0.9)
LYMPHOCYTES # BLD AUTO: 1.89 K/UL — SIGNIFICANT CHANGE UP (ref 1–3.3)
LYMPHOCYTES # BLD AUTO: 16.2 % — SIGNIFICANT CHANGE UP (ref 13–44)
MCHC RBC-ENTMCNC: 25.8 PG — LOW (ref 27–34)
MCHC RBC-ENTMCNC: 25.9 PG — LOW (ref 27–34)
MCHC RBC-ENTMCNC: 31.9 GM/DL — LOW (ref 32–36)
MCHC RBC-ENTMCNC: 31.9 GM/DL — LOW (ref 32–36)
MCV RBC AUTO: 80.9 FL — SIGNIFICANT CHANGE UP (ref 80–100)
MCV RBC AUTO: 81 FL — SIGNIFICANT CHANGE UP (ref 80–100)
MONOCYTES # BLD AUTO: 0.65 K/UL — SIGNIFICANT CHANGE UP (ref 0–0.9)
MONOCYTES NFR BLD AUTO: 5.6 % — SIGNIFICANT CHANGE UP (ref 2–14)
NEUTROPHILS # BLD AUTO: 9.06 K/UL — HIGH (ref 1.8–7.4)
NEUTROPHILS NFR BLD AUTO: 77.3 % — HIGH (ref 43–77)
NRBC # BLD: 0 /100 WBCS — SIGNIFICANT CHANGE UP (ref 0–0)
NRBC # BLD: 0 /100 WBCS — SIGNIFICANT CHANGE UP (ref 0–0)
PLATELET # BLD AUTO: 233 K/UL — SIGNIFICANT CHANGE UP (ref 150–400)
PLATELET # BLD AUTO: 258 K/UL — SIGNIFICANT CHANGE UP (ref 150–400)
POTASSIUM SERPL-MCNC: 3.8 MMOL/L — SIGNIFICANT CHANGE UP (ref 3.5–5.3)
POTASSIUM SERPL-SCNC: 3.8 MMOL/L — SIGNIFICANT CHANGE UP (ref 3.5–5.3)
RBC # BLD: 3.21 M/UL — LOW (ref 3.8–5.2)
RBC # BLD: 3.25 M/UL — LOW (ref 3.8–5.2)
RBC # FLD: 16 % — HIGH (ref 10.3–14.5)
RBC # FLD: 16.4 % — HIGH (ref 10.3–14.5)
SODIUM SERPL-SCNC: 134 MMOL/L — LOW (ref 135–145)
WBC # BLD: 10.87 K/UL — HIGH (ref 3.8–10.5)
WBC # BLD: 11.7 K/UL — HIGH (ref 3.8–10.5)
WBC # FLD AUTO: 10.87 K/UL — HIGH (ref 3.8–10.5)
WBC # FLD AUTO: 11.7 K/UL — HIGH (ref 3.8–10.5)

## 2024-10-25 PROCEDURE — 93970 EXTREMITY STUDY: CPT | Mod: 26

## 2024-10-25 RX ORDER — ACETAMINOPHEN 500 MG
650 TABLET ORAL EVERY 6 HOURS
Refills: 0 | Status: DISCONTINUED | OUTPATIENT
Start: 2024-10-25 | End: 2024-10-25

## 2024-10-25 RX ORDER — SODIUM CHLORIDE 9 MG/ML
1 INJECTION, SOLUTION INTRAMUSCULAR; INTRAVENOUS; SUBCUTANEOUS
Refills: 0 | Status: COMPLETED | OUTPATIENT
Start: 2024-10-25 | End: 2024-10-26

## 2024-10-25 RX ORDER — METHOCARBAMOL 500 MG/1
750 TABLET ORAL EVERY 8 HOURS
Refills: 0 | Status: DISCONTINUED | OUTPATIENT
Start: 2024-10-25 | End: 2024-10-27

## 2024-10-25 RX ORDER — ACETAMINOPHEN 500 MG
1000 TABLET ORAL ONCE
Refills: 0 | Status: COMPLETED | OUTPATIENT
Start: 2024-10-25 | End: 2024-10-25

## 2024-10-25 RX ORDER — ACETAMINOPHEN 500 MG
1000 TABLET ORAL ONCE
Refills: 0 | Status: COMPLETED | OUTPATIENT
Start: 2024-10-26 | End: 2024-10-26

## 2024-10-25 RX ORDER — SODIUM CHLORIDE 9 MG/ML
500 INJECTION, SOLUTION INTRAMUSCULAR; INTRAVENOUS; SUBCUTANEOUS ONCE
Refills: 0 | Status: COMPLETED | OUTPATIENT
Start: 2024-10-25 | End: 2024-10-25

## 2024-10-25 RX ADMIN — Medication 100 MILLIGRAM(S): at 05:10

## 2024-10-25 RX ADMIN — Medication 40 MILLIGRAM(S): at 21:27

## 2024-10-25 RX ADMIN — SODIUM CHLORIDE 500 MILLILITER(S): 9 INJECTION, SOLUTION INTRAMUSCULAR; INTRAVENOUS; SUBCUTANEOUS at 08:44

## 2024-10-25 RX ADMIN — Medication 5 MILLIGRAM(S): at 11:18

## 2024-10-25 RX ADMIN — Medication 1000 MILLIGRAM(S): at 14:20

## 2024-10-25 RX ADMIN — GABAPENTIN 200 MILLIGRAM(S): 300 CAPSULE ORAL at 05:10

## 2024-10-25 RX ADMIN — Medication 30 MILLILITER(S): at 19:29

## 2024-10-25 RX ADMIN — POLYETHYLENE GLYCOL 3350 17 GRAM(S): 17 POWDER, FOR SOLUTION ORAL at 11:18

## 2024-10-25 RX ADMIN — Medication 30 MILLILITER(S): at 07:12

## 2024-10-25 RX ADMIN — Medication 75 MILLILITER(S): at 08:44

## 2024-10-25 RX ADMIN — Medication 0.5 MILLIGRAM(S): at 06:10

## 2024-10-25 RX ADMIN — Medication 400 MILLIGRAM(S): at 13:20

## 2024-10-25 RX ADMIN — Medication 400 MILLIGRAM(S): at 20:10

## 2024-10-25 RX ADMIN — GABAPENTIN 200 MILLIGRAM(S): 300 CAPSULE ORAL at 21:26

## 2024-10-25 RX ADMIN — Medication 2 TABLET(S): at 21:27

## 2024-10-25 RX ADMIN — SODIUM CHLORIDE 1 GRAM(S): 9 INJECTION, SOLUTION INTRAMUSCULAR; INTRAVENOUS; SUBCUTANEOUS at 18:12

## 2024-10-25 RX ADMIN — METHOCARBAMOL 500 MILLIGRAM(S): 500 TABLET ORAL at 05:10

## 2024-10-25 RX ADMIN — METHOCARBAMOL 750 MILLIGRAM(S): 500 TABLET ORAL at 21:27

## 2024-10-25 RX ADMIN — METHOCARBAMOL 750 MILLIGRAM(S): 500 TABLET ORAL at 13:28

## 2024-10-25 RX ADMIN — GABAPENTIN 200 MILLIGRAM(S): 300 CAPSULE ORAL at 13:28

## 2024-10-25 RX ADMIN — Medication 1000 MILLIGRAM(S): at 21:10
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
## 2024-10-25 NOTE — PROGRESS NOTE ADULT - TIME BILLING
Patient is having a lot of incisional pain.  Motor / sensory intact.  Sat in chair.  Using PCA. IV Tylenol is also effective.  Continue Hemovacs.

## 2024-10-25 NOTE — OCCUPATIONAL THERAPY INITIAL EVALUATION ADULT - SITTING BALANCE: STATIC
slowness to response, repetition for task instruction/100% of the time/able to follow single-step instructions
good minus

## 2024-10-25 NOTE — PROGRESS NOTE ADULT - SUBJECTIVE AND OBJECTIVE BOX
Day 1 of Anesthesia Pain Management Service    SUBJECTIVE: It hurts to twist when I move    Pain Scale Score:	[X] Refer to charted pain scores    THERAPY:    [ ] IV PCA Morphine		[ ] 5 mg/mL	[ ] 1 mg/mL  [X] IV PCA Hydromorphone	[ ] 5 mg/mL	[X] 1 mg/mL  [ ] IV PCA Fentanyl		[ ] 50 micrograms/mL    Demand dose: 0.2 mg     Lockout: 6 minutes   Continuous Rate: 0 mg/hr  4 Hour Limit: 4 mg    MEDICATIONS  (STANDING):  bisacodyl 5 milliGRAM(s) Oral daily  enoxaparin Injectable 40 milliGRAM(s) SubCutaneous every 24 hours  gabapentin 200 milliGRAM(s) Oral three times a day  HYDROmorphone PCA (1 mG/mL) 30 milliLiter(s) PCA Continuous PCA Continuous  influenza   Vaccine 0.5 milliLiter(s) IntraMuscular once  lactated ringers. 1000 milliLiter(s) (75 mL/Hr) IV Continuous <Continuous>  methocarbamol 500 milliGRAM(s) Oral every 8 hours  polyethylene glycol 3350 17 Gram(s) Oral daily  senna 2 Tablet(s) Oral at bedtime  vancomycin  IVPB 1000 milliGRAM(s) IV Intermittent once    MEDICATIONS  (PRN):  acetaminophen   IVPB .. 1000 milliGRAM(s) IV Intermittent every 8 hours PRN Mild Pain (1 - 3)  calamine/zinc oxide Lotion 1 Application(s) Topical every 6 hours PRN Itching  diphenhydrAMINE Injectable 25 milliGRAM(s) IV Push every 6 hours PRN Rash and/or Itching  HYDROmorphone PCA (1 mG/mL) Rescue Clinician Bolus 0.5 milliGRAM(s) IV Push every 15 minutes PRN for Pain Scale GREATER THAN 6  nalbuphine Injectable 2.5 milliGRAM(s) IV Push every 6 hours PRN Pruritus  naloxone Injectable 0.1 milliGRAM(s) IV Push every 3 minutes PRN For ANY of the following changes in patient status:  A. RR LESS THAN 10 breaths per minute, B. Oxygen saturation LESS THAN 90%, C. Sedation score of 6  ondansetron Injectable 4 milliGRAM(s) IV Push every 6 hours PRN Nausea  ondansetron Injectable 4 milliGRAM(s) IV Push every 6 hours PRN Nausea and/or Vomiting      OBJECTIVE:    Sedation Score:	[ X] Alert 	[ ] Drowsy 	[ ] Arousable	[ ] Asleep	[ ] Unresponsive    Side Effects:	[X ] None	[ ] Nausea	[ ] Vomiting	[ ] Pruritus  		[ ] Other:    Vital Signs Last 24 Hrs  T(C): 36.8 (25 Oct 2024 07:50), Max: 36.9 (24 Oct 2024 23:00)  T(F): 98.2 (25 Oct 2024 07:50), Max: 98.5 (25 Oct 2024 04:45)  HR: 87 (25 Oct 2024 09:55) (71 - 96)  BP: 97/62 (25 Oct 2024 10:12) (83/50 - 108/57)  BP(mean): 67 (24 Oct 2024 18:45) (56 - 79)  RR: 18 (25 Oct 2024 07:50) (15 - 18)  SpO2: 98% (25 Oct 2024 10:12) (95% - 100%)    Parameters below as of 25 Oct 2024 10:12  Patient On (Oxygen Delivery Method): room air        ASSESSMENT/ PLAN    Therapy to  be:               [X] Continued   [ ] Discontinued   [ ] Changed to PRN Analgesics    Documentation and Verification of current medications:   [X] Done	[ ] Not done, not eligible    Comments: Endorsing effective PCA. Total PCA use 10.4mg / 19 hours. Reeducated to PCA use. Alert and oriented to person, place and time

## 2024-10-25 NOTE — PROGRESS NOTE ADULT - SUBJECTIVE AND OBJECTIVE BOX
SUBJECTIVE:   Patient was seen and evaluated at bedside. Patient is resting in bed and is in no new acute distress.   OVERNIGHT EVENTS:   none   Vital Signs Last 24 Hrs  T(C): 36.8 (25 Oct 2024 07:50), Max: 36.9 (24 Oct 2024 23:00)  T(F): 98.2 (25 Oct 2024 07:50), Max: 98.5 (25 Oct 2024 04:45)  HR: 87 (25 Oct 2024 09:55) (71 - 96)  BP: 97/62 (25 Oct 2024 10:12) (83/50 - 108/57)  BP(mean): 67 (24 Oct 2024 18:45) (56 - 79)  RR: 18 (25 Oct 2024 07:50) (15 - 18)  SpO2: 98% (25 Oct 2024 10:12) (95% - 100%)    Parameters below as of 25 Oct 2024 10:12  Patient On (Oxygen Delivery Method): room air        PHYSICAL EXAM:    General: No Acute Distress     Neurological: Awake, alert oriented to person, place and time, Following Commands, PERRL, EOMI, Face Symmetrical, Speech Fluent, Moving all extremities, Muscle Strength normal in all four extremities, No Drift, Sensation to Light Touch Intact    Pulmonary: Clear to Auscultation, No Rales, No Rhonchi, No Wheezes     Cardiovascular: S1, S2, Regular Rate and Rhythm     Gastrointestinal: Soft, Nontender, Nondistended     Incision:     LABS:                        8.3    11.70 )-----------( 258      ( 25 Oct 2024 06:45 )             26.0    10-25    134[L]  |  102  |  10  ----------------------------<  104[H]  3.8   |  23  |  0.71    Ca    8.2[L]      25 Oct 2024 06:44          10-24 @ 07:01  -  10-25 @ 07:00  --------------------------------------------------------  IN: 2065 mL / OUT: 1385 mL / NET: 680 mL    10-25 @ 07:01  -  10-25 @ 12:10  --------------------------------------------------------  IN: 740 mL / OUT: 0 mL / NET: 740 mL      DRAINS:     MEDICATIONS:  Antibiotics:  vancomycin  IVPB 1000 milliGRAM(s) IV Intermittent once    Neuro:  acetaminophen   IVPB .. 1000 milliGRAM(s) IV Intermittent every 8 hours PRN Mild Pain (1 - 3)  gabapentin 200 milliGRAM(s) Oral three times a day  HYDROmorphone PCA (1 mG/mL) 30 milliLiter(s) PCA Continuous PCA Continuous  HYDROmorphone PCA (1 mG/mL) Rescue Clinician Bolus 0.5 milliGRAM(s) IV Push every 15 minutes PRN for Pain Scale GREATER THAN 6  methocarbamol 750 milliGRAM(s) Oral every 8 hours  nalbuphine Injectable 2.5 milliGRAM(s) IV Push every 6 hours PRN Pruritus  ondansetron Injectable 4 milliGRAM(s) IV Push every 6 hours PRN Nausea  ondansetron Injectable 4 milliGRAM(s) IV Push every 6 hours PRN Nausea and/or Vomiting    Cardiac:    Pulm:  diphenhydrAMINE Injectable 25 milliGRAM(s) IV Push every 6 hours PRN Rash and/or Itching    GI/:  bisacodyl 5 milliGRAM(s) Oral daily  polyethylene glycol 3350 17 Gram(s) Oral daily  senna 2 Tablet(s) Oral at bedtime    Other:   calamine/zinc oxide Lotion 1 Application(s) Topical every 6 hours PRN Itching  enoxaparin Injectable 40 milliGRAM(s) SubCutaneous every 24 hours  influenza   Vaccine 0.5 milliLiter(s) IntraMuscular once  lactated ringers. 1000 milliLiter(s) IV Continuous <Continuous>  naloxone Injectable 0.1 milliGRAM(s) IV Push every 3 minutes PRN For ANY of the following changes in patient status:  A. RR LESS THAN 10 breaths per minute, B. Oxygen saturation LESS THAN 90%, C. Sedation score of 6  sodium chloride 1 Gram(s) Oral two times a day    DIET: [] Regular [] CCD [] Renal [] Puree [] Dysphagia [] Tube Feeds:   regular   Imaging :   no new imaging  SUBJECTIVE:   Patient was seen and evaluated at bedside. Patient is resting in bed and is in no new acute distress.   OVERNIGHT EVENTS:   none   Vital Signs Last 24 Hrs  T(C): 36.8 (25 Oct 2024 07:50), Max: 36.9 (24 Oct 2024 23:00)  T(F): 98.2 (25 Oct 2024 07:50), Max: 98.5 (25 Oct 2024 04:45)  HR: 87 (25 Oct 2024 09:55) (71 - 96)  BP: 97/62 (25 Oct 2024 10:12) (83/50 - 108/57)  BP(mean): 67 (24 Oct 2024 18:45) (56 - 79)  RR: 18 (25 Oct 2024 07:50) (15 - 18)  SpO2: 98% (25 Oct 2024 10:12) (95% - 100%)    Parameters below as of 25 Oct 2024 10:12  Patient On (Oxygen Delivery Method): room air        PHYSICAL EXAM:    General: No Acute Distress     Neurological: Awake, alert oriented to person, place and time, Following Commands, PERRL, EOMI, Face Symmetrical, Speech Fluent, Moving all extremities, Muscle Strength normal in all four extremities, No Drift, Sensation to Light Touch Intact    Pulmonary: Clear to Auscultation, No Rales, No Rhonchi, No Wheezes     Cardiovascular: S1, S2, Regular Rate and Rhythm     Gastrointestinal: Soft, Nontender, Nondistended     Incision:   clean and dry   LABS:                        8.3    11.70 )-----------( 258      ( 25 Oct 2024 06:45 )             26.0    10-25    134[L]  |  102  |  10  ----------------------------<  104[H]  3.8   |  23  |  0.71    Ca    8.2[L]      25 Oct 2024 06:44          10-24 @ 07:01  -  10-25 @ 07:00  --------------------------------------------------------  IN: 2065 mL / OUT: 1385 mL / NET: 680 mL    10-25 @ 07:01  -  10-25 @ 12:10  --------------------------------------------------------  IN: 740 mL / OUT: 0 mL / NET: 740 mL      DRAINS:   L  CC   R HMV 50 CC   MEDICATIONS:  Antibiotics:  vancomycin  IVPB 1000 milliGRAM(s) IV Intermittent once    Neuro:  acetaminophen   IVPB .. 1000 milliGRAM(s) IV Intermittent every 8 hours PRN Mild Pain (1 - 3)  gabapentin 200 milliGRAM(s) Oral three times a day  HYDROmorphone PCA (1 mG/mL) 30 milliLiter(s) PCA Continuous PCA Continuous  HYDROmorphone PCA (1 mG/mL) Rescue Clinician Bolus 0.5 milliGRAM(s) IV Push every 15 minutes PRN for Pain Scale GREATER THAN 6  methocarbamol 750 milliGRAM(s) Oral every 8 hours  nalbuphine Injectable 2.5 milliGRAM(s) IV Push every 6 hours PRN Pruritus  ondansetron Injectable 4 milliGRAM(s) IV Push every 6 hours PRN Nausea  ondansetron Injectable 4 milliGRAM(s) IV Push every 6 hours PRN Nausea and/or Vomiting    Cardiac:    Pulm:  diphenhydrAMINE Injectable 25 milliGRAM(s) IV Push every 6 hours PRN Rash and/or Itching    GI/:  bisacodyl 5 milliGRAM(s) Oral daily  polyethylene glycol 3350 17 Gram(s) Oral daily  senna 2 Tablet(s) Oral at bedtime    Other:   calamine/zinc oxide Lotion 1 Application(s) Topical every 6 hours PRN Itching  enoxaparin Injectable 40 milliGRAM(s) SubCutaneous every 24 hours  influenza   Vaccine 0.5 milliLiter(s) IntraMuscular once  lactated ringers. 1000 milliLiter(s) IV Continuous <Continuous>  naloxone Injectable 0.1 milliGRAM(s) IV Push every 3 minutes PRN For ANY of the following changes in patient status:  A. RR LESS THAN 10 breaths per minute, B. Oxygen saturation LESS THAN 90%, C. Sedation score of 6  sodium chloride 1 Gram(s) Oral two times a day    DIET: [] Regular [] CCD [] Renal [] Puree [] Dysphagia [] Tube Feeds:   regular   Imaging :   no new imaging

## 2024-10-25 NOTE — CHART NOTE - NSCHARTNOTEFT_GEN_A_CORE
CAPRINI SCORE [CLOT] Score on Admission for     AGE RELATED RISK FACTORS                                                       MOBILITY RELATED FACTORS  [ ] Age 41-60 years                                            (1 Point)                  [ ] Bed rest                                                        (1 Point)  [ ] Age: 61-74 years                                           (2 Points)                 [ ] Plaster cast                                                   (2 Points)  [ ] Age= 75 years                                              (3 Points)                 [ ] Bed bound for more than 72 hours                 (2 Points)    DISEASE RELATED RISK FACTORS                                               GENDER SPECIFIC FACTORS  [ ] Edema in the lower extremities                       (1 Point)                  [ ] Pregnancy                                                     (1 Point)  [ ] Varicose veins                                               (1 Point)                  [ ] Post-partum < 6 weeks                                   (1 Point)             [ x] BMI > 25 Kg/m2                                            (1 Point)                  [ ] Hormonal therapy  or oral contraception          (1 Point)                 [ ] Sepsis (in the previous month)                        (1 Point)                  [ ] History of pregnancy complications                 (1 point)  [ ] Pneumonia or serious lung disease                                               [ ] Unexplained or recurrent                     (1 Point)           (in the previous month)                               (1 Point)  [ ] Abnormal pulmonary function test                     (1 Point)                 SURGERY RELATED RISK FACTORS (include planned surgeries)  [ ] Acute myocardial infarction                              (1 Point)                 [ ]  Section                                             (1 Point)  [ ] Congestive heart failure (in the previous month)  (1 Point)         [ ] Minor surgery                                                  (1 Point)   [ ] Inflammatory bowel disease                             (1 Point)                 [ ] Arthroscopic surgery                                        (2 Points)  [ ] Central venous access                                      (2 Points)                [x ] General surgery lasting more than 45 minutes   (2 Points)       [ ] Stroke (in the previous month)                          (5 Points)               [ ] Elective arthroplasty                                         (5 Points)            [ ] current or past malignancy                              (2 Points)                                                                                                       HEMATOLOGY RELATED FACTORS                                                 TRAUMA RELATED RISK FACTORS  [ ] Prior episodes of VTE                                     (3 Points)                [ ] Fracture of the hip, pelvis, or leg                       (5 Points)  [ ] Positive family history for VTE                         (3 Points)                 [ ] Acute spinal cord injury (in the previous month)  (5 Points)  [ ] Prothrombin 01110 A                                     (3 Points)                 [ ] Paralysis  (less than 1 month)                             (5 Points)  [ ] Factor V Leiden                                             (3 Points)                  [ ] Multiple Trauma within 1 month                        (5 Points)  [ ] Lupus anticoagulants                                     (3 Points)                                                           [ ] Anticardiolipin antibodies                               (3 Points)                                                       [ ] High homocysteine in the blood                      (3 Points)                                             [ ] Other congenital or acquired thrombophilia      (3 Points)                                                [ ] Heparin induced thrombocytopenia                  (3 Points)                                          Total Score [      3    ]    Risk:  Very low 0   Low 1 to 2   Moderate 3 to 4   High =5       VTE Prophylasix Recommednations:  [x ] mechanical pneumatic compression devices                                      [ ] contraindicated: _____________________  [x ] chemo prophylasix                                                                                   [ ] contraindicated _____________________    **** HIGH LIKELIHOOD DVT PRESENT ON ADMISSION  [x ] (please order LE dopplers within 24 hours of admission)
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
## 2024-10-26 LAB
ALBUMIN SERPL ELPH-MCNC: 3.3 G/DL — SIGNIFICANT CHANGE UP (ref 3.3–5)
ALP SERPL-CCNC: 74 U/L — SIGNIFICANT CHANGE UP (ref 40–120)
ALT FLD-CCNC: 25 U/L — SIGNIFICANT CHANGE UP (ref 10–45)
ANION GAP SERPL CALC-SCNC: 10 MMOL/L — SIGNIFICANT CHANGE UP (ref 5–17)
ANION GAP SERPL CALC-SCNC: 11 MMOL/L — SIGNIFICANT CHANGE UP (ref 5–17)
APPEARANCE UR: CLEAR — SIGNIFICANT CHANGE UP
AST SERPL-CCNC: 46 U/L — HIGH (ref 10–40)
BASOPHILS # BLD AUTO: 0.03 K/UL — SIGNIFICANT CHANGE UP (ref 0–0.2)
BASOPHILS NFR BLD AUTO: 0.2 % — SIGNIFICANT CHANGE UP (ref 0–2)
BILIRUB SERPL-MCNC: 0.3 MG/DL — SIGNIFICANT CHANGE UP (ref 0.2–1.2)
BILIRUB UR-MCNC: NEGATIVE — SIGNIFICANT CHANGE UP
BUN SERPL-MCNC: 4 MG/DL — LOW (ref 7–23)
BUN SERPL-MCNC: 6 MG/DL — LOW (ref 7–23)
CALCIUM SERPL-MCNC: 7.9 MG/DL — LOW (ref 8.4–10.5)
CALCIUM SERPL-MCNC: 8.7 MG/DL — SIGNIFICANT CHANGE UP (ref 8.4–10.5)
CHLORIDE SERPL-SCNC: 101 MMOL/L — SIGNIFICANT CHANGE UP (ref 96–108)
CHLORIDE SERPL-SCNC: 105 MMOL/L — SIGNIFICANT CHANGE UP (ref 96–108)
CK MB CFR SERPL CALC: 1.1 NG/ML — SIGNIFICANT CHANGE UP (ref 0–3.8)
CO2 SERPL-SCNC: 20 MMOL/L — LOW (ref 22–31)
CO2 SERPL-SCNC: 24 MMOL/L — SIGNIFICANT CHANGE UP (ref 22–31)
COLOR SPEC: YELLOW — SIGNIFICANT CHANGE UP
CREAT SERPL-MCNC: 0.59 MG/DL — SIGNIFICANT CHANGE UP (ref 0.5–1.3)
CREAT SERPL-MCNC: 0.63 MG/DL — SIGNIFICANT CHANGE UP (ref 0.5–1.3)
DIFF PNL FLD: NEGATIVE — SIGNIFICANT CHANGE UP
EGFR: 119 ML/MIN/1.73M2 — SIGNIFICANT CHANGE UP
EGFR: 120 ML/MIN/1.73M2 — SIGNIFICANT CHANGE UP
EOSINOPHIL # BLD AUTO: 0.17 K/UL — SIGNIFICANT CHANGE UP (ref 0–0.5)
EOSINOPHIL NFR BLD AUTO: 1.3 % — SIGNIFICANT CHANGE UP (ref 0–6)
FLUAV AG NPH QL: SIGNIFICANT CHANGE UP
FLUBV AG NPH QL: SIGNIFICANT CHANGE UP
GAS PNL BLDV: SIGNIFICANT CHANGE UP
GLUCOSE BLDC GLUCOMTR-MCNC: 159 MG/DL — HIGH (ref 70–99)
GLUCOSE SERPL-MCNC: 112 MG/DL — HIGH (ref 70–99)
GLUCOSE SERPL-MCNC: 136 MG/DL — HIGH (ref 70–99)
GLUCOSE UR QL: NEGATIVE MG/DL — SIGNIFICANT CHANGE UP
HCT VFR BLD CALC: 25.2 % — LOW (ref 34.5–45)
HCT VFR BLD CALC: 27.7 % — LOW (ref 34.5–45)
HGB BLD-MCNC: 8.1 G/DL — LOW (ref 11.5–15.5)
HGB BLD-MCNC: 9.1 G/DL — LOW (ref 11.5–15.5)
IMM GRANULOCYTES NFR BLD AUTO: 0.3 % — SIGNIFICANT CHANGE UP (ref 0–0.9)
KETONES UR-MCNC: NEGATIVE MG/DL — SIGNIFICANT CHANGE UP
LEUKOCYTE ESTERASE UR-ACNC: NEGATIVE — SIGNIFICANT CHANGE UP
LYMPHOCYTES # BLD AUTO: 0.82 K/UL — LOW (ref 1–3.3)
LYMPHOCYTES # BLD AUTO: 6.3 % — LOW (ref 13–44)
MCHC RBC-ENTMCNC: 26 PG — LOW (ref 27–34)
MCHC RBC-ENTMCNC: 26.5 PG — LOW (ref 27–34)
MCHC RBC-ENTMCNC: 32.1 GM/DL — SIGNIFICANT CHANGE UP (ref 32–36)
MCHC RBC-ENTMCNC: 32.9 GM/DL — SIGNIFICANT CHANGE UP (ref 32–36)
MCV RBC AUTO: 80.8 FL — SIGNIFICANT CHANGE UP (ref 80–100)
MCV RBC AUTO: 80.8 FL — SIGNIFICANT CHANGE UP (ref 80–100)
MONOCYTES # BLD AUTO: 0.32 K/UL — SIGNIFICANT CHANGE UP (ref 0–0.9)
MONOCYTES NFR BLD AUTO: 2.5 % — SIGNIFICANT CHANGE UP (ref 2–14)
NEUTROPHILS # BLD AUTO: 11.66 K/UL — HIGH (ref 1.8–7.4)
NEUTROPHILS NFR BLD AUTO: 89.4 % — HIGH (ref 43–77)
NITRITE UR-MCNC: NEGATIVE — SIGNIFICANT CHANGE UP
NRBC # BLD: 0 /100 WBCS — SIGNIFICANT CHANGE UP (ref 0–0)
NRBC # BLD: 0 /100 WBCS — SIGNIFICANT CHANGE UP (ref 0–0)
PH UR: 7.5 — SIGNIFICANT CHANGE UP (ref 5–8)
PLATELET # BLD AUTO: 238 K/UL — SIGNIFICANT CHANGE UP (ref 150–400)
PLATELET # BLD AUTO: 275 K/UL — SIGNIFICANT CHANGE UP (ref 150–400)
POTASSIUM SERPL-MCNC: 3.3 MMOL/L — LOW (ref 3.5–5.3)
POTASSIUM SERPL-MCNC: 4.9 MMOL/L — SIGNIFICANT CHANGE UP (ref 3.5–5.3)
POTASSIUM SERPL-SCNC: 3.3 MMOL/L — LOW (ref 3.5–5.3)
POTASSIUM SERPL-SCNC: 4.9 MMOL/L — SIGNIFICANT CHANGE UP (ref 3.5–5.3)
PROT SERPL-MCNC: 6.6 G/DL — SIGNIFICANT CHANGE UP (ref 6–8.3)
PROT UR-MCNC: NEGATIVE MG/DL — SIGNIFICANT CHANGE UP
RBC # BLD: 3.12 M/UL — LOW (ref 3.8–5.2)
RBC # BLD: 3.43 M/UL — LOW (ref 3.8–5.2)
RBC # FLD: 16.4 % — HIGH (ref 10.3–14.5)
RBC # FLD: 17 % — HIGH (ref 10.3–14.5)
RSV RNA NPH QL NAA+NON-PROBE: SIGNIFICANT CHANGE UP
SARS-COV-2 RNA SPEC QL NAA+PROBE: SIGNIFICANT CHANGE UP
SODIUM SERPL-SCNC: 135 MMOL/L — SIGNIFICANT CHANGE UP (ref 135–145)
SODIUM SERPL-SCNC: 136 MMOL/L — SIGNIFICANT CHANGE UP (ref 135–145)
SP GR SPEC: 1.01 — SIGNIFICANT CHANGE UP (ref 1–1.03)
TROPONIN T, HIGH SENSITIVITY RESULT: <6 NG/L — SIGNIFICANT CHANGE UP (ref 0–51)
UROBILINOGEN FLD QL: 0.2 MG/DL — SIGNIFICANT CHANGE UP (ref 0.2–1)
WBC # BLD: 10.29 K/UL — SIGNIFICANT CHANGE UP (ref 3.8–10.5)
WBC # BLD: 13.04 K/UL — HIGH (ref 3.8–10.5)
WBC # FLD AUTO: 10.29 K/UL — SIGNIFICANT CHANGE UP (ref 3.8–10.5)
WBC # FLD AUTO: 13.04 K/UL — HIGH (ref 3.8–10.5)

## 2024-10-26 PROCEDURE — 71045 X-RAY EXAM CHEST 1 VIEW: CPT | Mod: 26

## 2024-10-26 RX ORDER — CEFEPIME 2 G/1
1000 INJECTION, POWDER, FOR SOLUTION INTRAVENOUS EVERY 8 HOURS
Refills: 0 | Status: DISCONTINUED | OUTPATIENT
Start: 2024-10-27 | End: 2024-10-27

## 2024-10-26 RX ORDER — POTASSIUM CHLORIDE 10 MEQ
40 TABLET, EXTENDED RELEASE ORAL EVERY 4 HOURS
Refills: 0 | Status: COMPLETED | OUTPATIENT
Start: 2024-10-26 | End: 2024-10-26

## 2024-10-26 RX ORDER — DEXAMETHASONE 1.5 MG 1.5 MG/1
4 TABLET ORAL EVERY 6 HOURS
Refills: 0 | Status: COMPLETED | OUTPATIENT
Start: 2024-10-26 | End: 2024-10-27

## 2024-10-26 RX ORDER — CEFEPIME 2 G/1
1000 INJECTION, POWDER, FOR SOLUTION INTRAVENOUS ONCE
Refills: 0 | Status: COMPLETED | OUTPATIENT
Start: 2024-10-26 | End: 2024-10-26

## 2024-10-26 RX ORDER — ACETAMINOPHEN 500 MG
1000 TABLET ORAL ONCE
Refills: 0 | Status: COMPLETED | OUTPATIENT
Start: 2024-10-26 | End: 2024-10-26

## 2024-10-26 RX ORDER — DEXAMETHASONE 1.5 MG 1.5 MG/1
6 TABLET ORAL ONCE
Refills: 0 | Status: COMPLETED | OUTPATIENT
Start: 2024-10-26 | End: 2024-10-27

## 2024-10-26 RX ORDER — GABAPENTIN 300 MG/1
300 CAPSULE ORAL THREE TIMES A DAY
Refills: 0 | Status: DISCONTINUED | OUTPATIENT
Start: 2024-10-26 | End: 2024-10-28

## 2024-10-26 RX ORDER — CEFEPIME 2 G/1
INJECTION, POWDER, FOR SOLUTION INTRAVENOUS
Refills: 0 | Status: DISCONTINUED | OUTPATIENT
Start: 2024-10-26 | End: 2024-10-27

## 2024-10-26 RX ORDER — POLYETHYLENE GLYCOL 3350 17 G/17G
17 POWDER, FOR SOLUTION ORAL
Refills: 0 | Status: DISCONTINUED | OUTPATIENT
Start: 2024-10-26 | End: 2024-11-01

## 2024-10-26 RX ORDER — SODIUM CHLORIDE 9 MG/ML
1000 INJECTION, SOLUTION INTRAMUSCULAR; INTRAVENOUS; SUBCUTANEOUS ONCE
Refills: 0 | Status: COMPLETED | OUTPATIENT
Start: 2024-10-26 | End: 2024-10-26

## 2024-10-26 RX ORDER — ACETAMINOPHEN 500 MG
1000 TABLET ORAL ONCE
Refills: 0 | Status: COMPLETED | OUTPATIENT
Start: 2024-10-26 | End: 2024-10-27

## 2024-10-26 RX ORDER — SODIUM CHLORIDE 9 MG/ML
1000 INJECTION, SOLUTION INTRAMUSCULAR; INTRAVENOUS; SUBCUTANEOUS
Refills: 0 | Status: COMPLETED | OUTPATIENT
Start: 2024-10-26 | End: 2024-10-26

## 2024-10-26 RX ADMIN — Medication 2 TABLET(S): at 21:47

## 2024-10-26 RX ADMIN — DEXAMETHASONE 1.5 MG 4 MILLIGRAM(S): 1.5 TABLET ORAL at 18:53

## 2024-10-26 RX ADMIN — Medication 30 MILLILITER(S): at 07:29

## 2024-10-26 RX ADMIN — Medication 40 MILLIEQUIVALENT(S): at 14:36

## 2024-10-26 RX ADMIN — Medication 40 MILLIEQUIVALENT(S): at 09:56

## 2024-10-26 RX ADMIN — SODIUM CHLORIDE 1000 MILLILITER(S): 9 INJECTION, SOLUTION INTRAMUSCULAR; INTRAVENOUS; SUBCUTANEOUS at 09:46

## 2024-10-26 RX ADMIN — Medication 400 MILLIGRAM(S): at 04:06

## 2024-10-26 RX ADMIN — POLYETHYLENE GLYCOL 3350 17 GRAM(S): 17 POWDER, FOR SOLUTION ORAL at 18:53

## 2024-10-26 RX ADMIN — METHOCARBAMOL 750 MILLIGRAM(S): 500 TABLET ORAL at 14:37

## 2024-10-26 RX ADMIN — SODIUM CHLORIDE 1 GRAM(S): 9 INJECTION, SOLUTION INTRAMUSCULAR; INTRAVENOUS; SUBCUTANEOUS at 06:17

## 2024-10-26 RX ADMIN — DEXAMETHASONE 1.5 MG 4 MILLIGRAM(S): 1.5 TABLET ORAL at 23:11

## 2024-10-26 RX ADMIN — Medication 40 MILLIGRAM(S): at 21:46

## 2024-10-26 RX ADMIN — Medication 30 MILLILITER(S): at 03:23

## 2024-10-26 RX ADMIN — METHOCARBAMOL 750 MILLIGRAM(S): 500 TABLET ORAL at 21:46

## 2024-10-26 RX ADMIN — Medication 1000 MILLIGRAM(S): at 05:06

## 2024-10-26 RX ADMIN — GABAPENTIN 200 MILLIGRAM(S): 300 CAPSULE ORAL at 06:17

## 2024-10-26 RX ADMIN — Medication 5 MILLIGRAM(S): at 12:07

## 2024-10-26 RX ADMIN — METHOCARBAMOL 750 MILLIGRAM(S): 500 TABLET ORAL at 06:17

## 2024-10-26 RX ADMIN — Medication 30 MILLILITER(S): at 19:24

## 2024-10-26 RX ADMIN — SODIUM CHLORIDE 1 GRAM(S): 9 INJECTION, SOLUTION INTRAMUSCULAR; INTRAVENOUS; SUBCUTANEOUS at 18:52

## 2024-10-26 RX ADMIN — CEFEPIME 100 MILLIGRAM(S): 2 INJECTION, POWDER, FOR SOLUTION INTRAVENOUS at 21:46

## 2024-10-26 RX ADMIN — Medication 400 MILLIGRAM(S): at 20:50

## 2024-10-26 RX ADMIN — GABAPENTIN 300 MILLIGRAM(S): 300 CAPSULE ORAL at 21:46

## 2024-10-26 RX ADMIN — GABAPENTIN 300 MILLIGRAM(S): 300 CAPSULE ORAL at 14:37

## 2024-10-26 RX ADMIN — SODIUM CHLORIDE 100 MILLILITER(S): 9 INJECTION, SOLUTION INTRAMUSCULAR; INTRAVENOUS; SUBCUTANEOUS at 23:11

## 2024-10-26 RX ADMIN — Medication 1000 MILLIGRAM(S): at 21:50

## 2024-10-26 NOTE — RAPID RESPONSE TEAM SUMMARY - NSOTHERINTERVENTIONSRRT_GEN_ALL_CORE
Consider evaluation for DVT/PE - duplex vs CTPA per primary team  Repeat cultures - urine culture, blood culture x2  f/u labs

## 2024-10-26 NOTE — PROGRESS NOTE ADULT - ASSESSMENT
Day 1 of Anesthesia Pain Management Service    SUBJECTIVE: Patient is doing well with IV PCA    Pain Scale Score:	[X] Refer to charted pain scores    THERAPY:    [ ] IV PCA Morphine		[ ] 5 mg/mL	[ ] 1 mg/mL  [X] IV PCA Hydromorphone	[ ] 5 mg/mL	[X] 1 mg/mL  [ ] IV PCA Fentanyl		[ ] 50 micrograms/mL    Demand dose: 0.2 mg     Lockout: 6 minutes   Continuous Rate: 0 mg/hr  4 Hour Limit: 4 mg    MEDICATIONS  (STANDING):  bisacodyl 5 milliGRAM(s) Oral daily  enoxaparin Injectable 40 milliGRAM(s) SubCutaneous every 24 hours  gabapentin 200 milliGRAM(s) Oral three times a day  HYDROmorphone PCA (1 mG/mL) 30 milliLiter(s) PCA Continuous PCA Continuous  influenza   Vaccine 0.5 milliLiter(s) IntraMuscular once  methocarbamol 750 milliGRAM(s) Oral every 8 hours  polyethylene glycol 3350 17 Gram(s) Oral two times a day  potassium chloride    Tablet ER 40 milliEquivalent(s) Oral every 4 hours  senna 2 Tablet(s) Oral at bedtime  sodium chloride 1 Gram(s) Oral two times a day  sodium chloride 0.9% Bolus 1000 milliLiter(s) IV Bolus once  sodium chloride 0.9%. 1000 milliLiter(s) (100 mL/Hr) IV Continuous <Continuous>    MEDICATIONS  (PRN):  calamine/zinc oxide Lotion 1 Application(s) Topical every 6 hours PRN Itching  diphenhydrAMINE Injectable 25 milliGRAM(s) IV Push every 6 hours PRN Rash and/or Itching  HYDROmorphone PCA (1 mG/mL) Rescue Clinician Bolus 0.5 milliGRAM(s) IV Push every 15 minutes PRN for Pain Scale GREATER THAN 6  nalbuphine Injectable 2.5 milliGRAM(s) IV Push every 6 hours PRN Pruritus  naloxone Injectable 0.1 milliGRAM(s) IV Push every 3 minutes PRN For ANY of the following changes in patient status:  A. RR LESS THAN 10 breaths per minute, B. Oxygen saturation LESS THAN 90%, C. Sedation score of 6  ondansetron Injectable 4 milliGRAM(s) IV Push every 6 hours PRN Nausea  ondansetron Injectable 4 milliGRAM(s) IV Push every 6 hours PRN Nausea and/or Vomiting      OBJECTIVE:    Sedation Score:	[ X] Alert	[ ] Drowsy 	[ ] Arousable	[ ] Asleep	[ ] Unresponsive    Side Effects:	[X ] None	[ ] Nausea	[ ] Vomiting	[ ] Pruritus  		[ ] Other:    Vital Signs Last 24 Hrs  T(C): 37.1 (26 Oct 2024 08:14), Max: 38 (26 Oct 2024 04:15)  T(F): 98.8 (26 Oct 2024 08:14), Max: 100.4 (26 Oct 2024 04:15)  HR: 94 (26 Oct 2024 08:14) (84 - 107)  BP: 93/59 (26 Oct 2024 08:14) (90/60 - 108/72)  BP(mean): --  RR: 18 (26 Oct 2024 08:14) (18 - 18)  SpO2: 100% (26 Oct 2024 08:14) (95% - 100%)    Parameters below as of 26 Oct 2024 08:14  Patient On (Oxygen Delivery Method): room air        ASSESSMENT/ PLAN    Therapy to  be:               [X] Continued   [ ] Discontinued   [ ] Changed to PRN Analgesics    Documentation and Verification of current medications:   [X] Done	[ ] Not done, not eligible    Comments: Day 1 of Anesthesia Pain Management Service    SUBJECTIVE: Patient is doing well with IV PCA    Pain Scale Score:	[X] Refer to charted pain scores    THERAPY:    [ ] IV PCA Morphine		[ ] 5 mg/mL	[ ] 1 mg/mL  [X] IV PCA Hydromorphone	[ ] 5 mg/mL	[X] 1 mg/mL  [ ] IV PCA Fentanyl		[ ] 50 micrograms/mL    Demand dose: 0.2 mg     Lockout: 6 minutes   Continuous Rate: 0 mg/hr  4 Hour Limit: 4 mg    MEDICATIONS  (STANDING):  bisacodyl 5 milliGRAM(s) Oral daily  enoxaparin Injectable 40 milliGRAM(s) SubCutaneous every 24 hours  gabapentin 200 milliGRAM(s) Oral three times a day  HYDROmorphone PCA (1 mG/mL) 30 milliLiter(s) PCA Continuous PCA Continuous  influenza   Vaccine 0.5 milliLiter(s) IntraMuscular once  methocarbamol 750 milliGRAM(s) Oral every 8 hours  polyethylene glycol 3350 17 Gram(s) Oral two times a day  potassium chloride    Tablet ER 40 milliEquivalent(s) Oral every 4 hours  senna 2 Tablet(s) Oral at bedtime  sodium chloride 1 Gram(s) Oral two times a day  sodium chloride 0.9% Bolus 1000 milliLiter(s) IV Bolus once  sodium chloride 0.9%. 1000 milliLiter(s) (100 mL/Hr) IV Continuous <Continuous>    MEDICATIONS  (PRN):  calamine/zinc oxide Lotion 1 Application(s) Topical every 6 hours PRN Itching  diphenhydrAMINE Injectable 25 milliGRAM(s) IV Push every 6 hours PRN Rash and/or Itching  HYDROmorphone PCA (1 mG/mL) Rescue Clinician Bolus 0.5 milliGRAM(s) IV Push every 15 minutes PRN for Pain Scale GREATER THAN 6  nalbuphine Injectable 2.5 milliGRAM(s) IV Push every 6 hours PRN Pruritus  naloxone Injectable 0.1 milliGRAM(s) IV Push every 3 minutes PRN For ANY of the following changes in patient status:  A. RR LESS THAN 10 breaths per minute, B. Oxygen saturation LESS THAN 90%, C. Sedation score of 6  ondansetron Injectable 4 milliGRAM(s) IV Push every 6 hours PRN Nausea  ondansetron Injectable 4 milliGRAM(s) IV Push every 6 hours PRN Nausea and/or Vomiting      OBJECTIVE:    Sedation Score:	[ X] Alert	[ ] Drowsy 	[ ] Arousable	[ ] Asleep	[ ] Unresponsive    Side Effects:	[X ] None	[ ] Nausea	[ ] Vomiting	[ ] Pruritus  		[ ] Other:    Vital Signs Last 24 Hrs  T(C): 37.1 (26 Oct 2024 08:14), Max: 38 (26 Oct 2024 04:15)  T(F): 98.8 (26 Oct 2024 08:14), Max: 100.4 (26 Oct 2024 04:15)  HR: 94 (26 Oct 2024 08:14) (84 - 107)  BP: 93/59 (26 Oct 2024 08:14) (90/60 - 108/72)  BP(mean): --  RR: 18 (26 Oct 2024 08:14) (18 - 18)  SpO2: 100% (26 Oct 2024 08:14) (95% - 100%)    Parameters below as of 26 Oct 2024 08:14  Patient On (Oxygen Delivery Method): room air        ASSESSMENT/ PLAN    Therapy to  be:               [ ] Continued   [X] Discontinued   [X] Changed to PRN Analgesics    Documentation and Verification of current medications:   [X] Done	[ ] Not done, not eligible    Comments:

## 2024-10-26 NOTE — RAPID RESPONSE TEAM SUMMARY - NSSITUATIONBACKGROUNDRRT_GEN_ALL_CORE
34F no pmhx L5 decompressive laminectomy, L5-S1 combined posterolateral posterior lumbar interbody fusion, L5-S1 pedicle screw fixation, iliac crest bone graft with MD Joyner on 10/24/24. RRT called for chest tightness and sob. FSG normal, Rectal temp febrile to 101. EKG wnl. Cardiac enzymes sent. Started ofirmev 1g, cefepime. patient also with complaint of le numbness/tingling. Sensation intact, able to move LE. NSxy bedside to evaluate.

## 2024-10-26 NOTE — PROVIDER CONTACT NOTE (SEPSIS SCREENING) - NOTIFICATION DETAILS (SBAR) SITUATION:
A&Ox4, VSS except temp of 100.4, , BP 90/60 taken manually. Pt c/o mild headache, no other complaints at this time. Pt has IV tylenol & IV PCA pump Dilaudid running as ordered. Pt voiding & hydrating adequately, passing flatus. Neuro checks performed as ordered, RLE numbness reported. Pt ambulating 1A w/ walker to commode, adrien activity as ordered.  Lumbar aquacel CDI, HMVC x2 WDL. No other concerns at this time.
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
## 2024-10-26 NOTE — PROVIDER CONTACT NOTE (SEPSIS SCREENING) - ACTION/TREATMENT ORDERED:
MD made aware, placed an order for blood cultures, UA, flu panel, chest x-ray & dopplers. No other orders at this time, care continues.

## 2024-10-26 NOTE — PROGRESS NOTE ADULT - SUBJECTIVE AND OBJECTIVE BOX
SUBJECTIVE: patient in chair, c/o increased numbness of right below knee and mostly the foot; more comfortable overall than yesterday, febrile overngiht, workup in progress  +PCA/fluids, drains x 2     Vital Signs Last 24 Hrs  T(C): 37.1 (26 Oct 2024 16:36), Max: 38 (26 Oct 2024 04:15)  T(F): 98.8 (26 Oct 2024 16:36), Max: 100.4 (26 Oct 2024 04:15)  HR: 86 (26 Oct 2024 16:36) (85 - 107)  BP: 100/65 (26 Oct 2024 16:36) (90/60 - 103/60)  BP(mean): --  RR: 18 (26 Oct 2024 16:36) (18 - 18)  SpO2: 97% (26 Oct 2024 16:36) (96% - 100%)    Parameters below as of 26 Oct 2024 16:36  Patient On (Oxygen Delivery Method): room air    DRAINS:   L  CC/24h   R HMV 50 CC/ 24h    PHYSICAL EXAM:    General: No Acute Distress     Neurological: Awake, alert oriented to person, place and time, Following Commands, PERRL, EOMI, Face Symmetrical, Speech Fluent, Moving all extremities, Muscle Strength normal in all four extremities, No Drift, Sensation to Light Touch Intact except R LE below knee and sole of foot     Pulmonary: Clear to Auscultation, No Rales, No Rhonchi, No Wheezes     Cardiovascular: S1, S2, Regular Rate and Rhythm     Gastrointestinal: Soft, Nontender, Nondistended     Incision:   clean and dry; drains x 2 +dressing    LABS:                               8.1    10.29 )-----------( 238      ( 26 Oct 2024 05:41 )             25.2   10-26    136  |  101  |  6[L]  ----------------------------<  112[H]  3.3[L]   |  24  |  0.63    Ca    7.9[L]      26 Oct 2024 05:41    Urinalysis (10.26.24 @ 07:16)    pH Urine: 7.5   Glucose Qualitative, Urine: Negative mg/dL   Blood, Urine: Negative   Color: Yellow   Urine Appearance: Clear   Bilirubin: Negative   Ketone - Urine: Negative mg/dL   Specific Gravity: 1.007   Protein, Urine: Negative mg/dL   Urobilinogen: 0.2 mg/dL   Nitrite: Negative   Leukocyte Esterase Concentration: Negative          < from: Xray Chest 1 View AP/PA (10.26.24 @ 07:40) >  IMPRESSION:  Clear lungs.    < end of copied text >  < from: [facility] Duplex Lower Ext Vein Scan, Bilat (10.25.24 @ 15:05) >  IMPRESSION:  No evidence of deep venous thrombosis in either lower extremity.    < end of copied text >    MEDICATIONS  (STANDING):  bisacodyl 5 milliGRAM(s) Oral daily  dexAMETHasone  Injectable 6 milliGRAM(s) IV Push once  dexAMETHasone  Injectable 4 milliGRAM(s) IV Push every 6 hours  enoxaparin Injectable 40 milliGRAM(s) SubCutaneous every 24 hours  gabapentin 300 milliGRAM(s) Oral three times a day  HYDROmorphone PCA (1 mG/mL) 30 milliLiter(s) PCA Continuous PCA Continuous  influenza   Vaccine 0.5 milliLiter(s) IntraMuscular once  methocarbamol 750 milliGRAM(s) Oral every 8 hours  polyethylene glycol 3350 17 Gram(s) Oral two times a day  senna 2 Tablet(s) Oral at bedtime  sodium chloride 1 Gram(s) Oral two times a day  sodium chloride 0.9%. 1000 milliLiter(s) (100 mL/Hr) IV Continuous <Continuous>    MEDICATIONS  (PRN):  calamine/zinc oxide Lotion 1 Application(s) Topical every 6 hours PRN Itching  diphenhydrAMINE Injectable 25 milliGRAM(s) IV Push every 6 hours PRN Rash and/or Itching  HYDROmorphone PCA (1 mG/mL) Rescue Clinician Bolus 0.5 milliGRAM(s) IV Push every 15 minutes PRN for Pain Scale GREATER THAN 6  nalbuphine Injectable 2.5 milliGRAM(s) IV Push every 6 hours PRN Pruritus  naloxone Injectable 0.1 milliGRAM(s) IV Push every 3 minutes PRN For ANY of the following changes in patient status:  A. RR LESS THAN 10 breaths per minute, B. Oxygen saturation LESS THAN 90%, C. Sedation score of 6  ondansetron Injectable 4 milliGRAM(s) IV Push every 6 hours PRN Nausea    DIET: [] Regular [] CCD [] Renal [] Puree [] Dysphagia [] Tube Feeds:   regular

## 2024-10-27 DIAGNOSIS — R50.9 FEVER, UNSPECIFIED: ICD-10-CM

## 2024-10-27 LAB
ANION GAP SERPL CALC-SCNC: 10 MMOL/L — SIGNIFICANT CHANGE UP (ref 5–17)
ANION GAP SERPL CALC-SCNC: 11 MMOL/L — SIGNIFICANT CHANGE UP (ref 5–17)
APPEARANCE UR: CLEAR — SIGNIFICANT CHANGE UP
BACTERIA # UR AUTO: NEGATIVE /HPF — SIGNIFICANT CHANGE UP
BASOPHILS # BLD AUTO: 0.02 K/UL — SIGNIFICANT CHANGE UP (ref 0–0.2)
BASOPHILS NFR BLD AUTO: 0.2 % — SIGNIFICANT CHANGE UP (ref 0–2)
BILIRUB UR-MCNC: NEGATIVE — SIGNIFICANT CHANGE UP
BUN SERPL-MCNC: 9 MG/DL — SIGNIFICANT CHANGE UP (ref 7–23)
BUN SERPL-MCNC: <4 MG/DL — LOW (ref 7–23)
CALCIUM SERPL-MCNC: 8.9 MG/DL — SIGNIFICANT CHANGE UP (ref 8.4–10.5)
CALCIUM SERPL-MCNC: 8.9 MG/DL — SIGNIFICANT CHANGE UP (ref 8.4–10.5)
CAST: 0 /LPF — SIGNIFICANT CHANGE UP (ref 0–4)
CHLORIDE SERPL-SCNC: 104 MMOL/L — SIGNIFICANT CHANGE UP (ref 96–108)
CHLORIDE SERPL-SCNC: 106 MMOL/L — SIGNIFICANT CHANGE UP (ref 96–108)
CO2 SERPL-SCNC: 22 MMOL/L — SIGNIFICANT CHANGE UP (ref 22–31)
CO2 SERPL-SCNC: 22 MMOL/L — SIGNIFICANT CHANGE UP (ref 22–31)
COLOR SPEC: YELLOW — SIGNIFICANT CHANGE UP
CREAT SERPL-MCNC: 0.48 MG/DL — LOW (ref 0.5–1.3)
CREAT SERPL-MCNC: 0.61 MG/DL — SIGNIFICANT CHANGE UP (ref 0.5–1.3)
DIFF PNL FLD: ABNORMAL
EGFR: 119 ML/MIN/1.73M2 — SIGNIFICANT CHANGE UP
EGFR: 127 ML/MIN/1.73M2 — SIGNIFICANT CHANGE UP
EOSINOPHIL # BLD AUTO: 0 K/UL — SIGNIFICANT CHANGE UP (ref 0–0.5)
EOSINOPHIL NFR BLD AUTO: 0 % — SIGNIFICANT CHANGE UP (ref 0–6)
GLUCOSE SERPL-MCNC: 130 MG/DL — HIGH (ref 70–99)
GLUCOSE SERPL-MCNC: 156 MG/DL — HIGH (ref 70–99)
GLUCOSE UR QL: NEGATIVE MG/DL — SIGNIFICANT CHANGE UP
HCT VFR BLD CALC: 27.9 % — LOW (ref 34.5–45)
HGB BLD-MCNC: 8.7 G/DL — LOW (ref 11.5–15.5)
IMM GRANULOCYTES NFR BLD AUTO: 0.5 % — SIGNIFICANT CHANGE UP (ref 0–0.9)
KETONES UR-MCNC: 40 MG/DL
LEUKOCYTE ESTERASE UR-ACNC: NEGATIVE — SIGNIFICANT CHANGE UP
LYMPHOCYTES # BLD AUTO: 0.6 K/UL — LOW (ref 1–3.3)
LYMPHOCYTES # BLD AUTO: 5.5 % — LOW (ref 13–44)
MCHC RBC-ENTMCNC: 25.9 PG — LOW (ref 27–34)
MCHC RBC-ENTMCNC: 31.2 GM/DL — LOW (ref 32–36)
MCV RBC AUTO: 83 FL — SIGNIFICANT CHANGE UP (ref 80–100)
MONOCYTES # BLD AUTO: 0.26 K/UL — SIGNIFICANT CHANGE UP (ref 0–0.9)
MONOCYTES NFR BLD AUTO: 2.4 % — SIGNIFICANT CHANGE UP (ref 2–14)
NEUTROPHILS # BLD AUTO: 9.9 K/UL — HIGH (ref 1.8–7.4)
NEUTROPHILS NFR BLD AUTO: 91.4 % — HIGH (ref 43–77)
NITRITE UR-MCNC: NEGATIVE — SIGNIFICANT CHANGE UP
NRBC # BLD: 0 /100 WBCS — SIGNIFICANT CHANGE UP (ref 0–0)
PH UR: 6.5 — SIGNIFICANT CHANGE UP (ref 5–8)
PLATELET # BLD AUTO: 282 K/UL — SIGNIFICANT CHANGE UP (ref 150–400)
POTASSIUM SERPL-MCNC: 3.8 MMOL/L — SIGNIFICANT CHANGE UP (ref 3.5–5.3)
POTASSIUM SERPL-MCNC: 3.9 MMOL/L — SIGNIFICANT CHANGE UP (ref 3.5–5.3)
POTASSIUM SERPL-SCNC: 3.8 MMOL/L — SIGNIFICANT CHANGE UP (ref 3.5–5.3)
POTASSIUM SERPL-SCNC: 3.9 MMOL/L — SIGNIFICANT CHANGE UP (ref 3.5–5.3)
PROT UR-MCNC: NEGATIVE MG/DL — SIGNIFICANT CHANGE UP
RBC # BLD: 3.36 M/UL — LOW (ref 3.8–5.2)
RBC # FLD: 16.9 % — HIGH (ref 10.3–14.5)
RBC CASTS # UR COMP ASSIST: 3 /HPF — SIGNIFICANT CHANGE UP (ref 0–4)
REVIEW: SIGNIFICANT CHANGE UP
SODIUM SERPL-SCNC: 137 MMOL/L — SIGNIFICANT CHANGE UP (ref 135–145)
SODIUM SERPL-SCNC: 138 MMOL/L — SIGNIFICANT CHANGE UP (ref 135–145)
SP GR SPEC: 1.01 — SIGNIFICANT CHANGE UP (ref 1–1.03)
SQUAMOUS # UR AUTO: 2 /HPF — SIGNIFICANT CHANGE UP (ref 0–5)
TROPONIN T, HIGH SENSITIVITY RESULT: <6 NG/L — SIGNIFICANT CHANGE UP (ref 0–51)
UROBILINOGEN FLD QL: 0.2 MG/DL — SIGNIFICANT CHANGE UP (ref 0.2–1)
WBC # BLD: 10.83 K/UL — HIGH (ref 3.8–10.5)
WBC # FLD AUTO: 10.83 K/UL — HIGH (ref 3.8–10.5)
WBC UR QL: 2 /HPF — SIGNIFICANT CHANGE UP (ref 0–5)

## 2024-10-27 PROCEDURE — 99223 1ST HOSP IP/OBS HIGH 75: CPT

## 2024-10-27 PROCEDURE — 93010 ELECTROCARDIOGRAM REPORT: CPT

## 2024-10-27 RX ORDER — DEXAMETHASONE 1.5 MG 1.5 MG/1
4 TABLET ORAL EVERY 6 HOURS
Refills: 0 | Status: COMPLETED | OUTPATIENT
Start: 2024-10-27 | End: 2024-10-28

## 2024-10-27 RX ORDER — ACETAMINOPHEN 500 MG
650 TABLET ORAL EVERY 6 HOURS
Refills: 0 | Status: DISCONTINUED | OUTPATIENT
Start: 2024-10-27 | End: 2024-11-01

## 2024-10-27 RX ORDER — OXYCODONE HYDROCHLORIDE 30 MG/1
5 TABLET ORAL EVERY 4 HOURS
Refills: 0 | Status: DISCONTINUED | OUTPATIENT
Start: 2024-10-27 | End: 2024-10-29

## 2024-10-27 RX ORDER — METHOCARBAMOL 500 MG/1
500 TABLET ORAL EVERY 6 HOURS
Refills: 0 | Status: DISCONTINUED | OUTPATIENT
Start: 2024-10-27 | End: 2024-10-30

## 2024-10-27 RX ORDER — SODIUM CHLORIDE 9 MG/ML
500 INJECTION, SOLUTION INTRAMUSCULAR; INTRAVENOUS; SUBCUTANEOUS ONCE
Refills: 0 | Status: COMPLETED | OUTPATIENT
Start: 2024-10-27 | End: 2024-10-27

## 2024-10-27 RX ORDER — OXYCODONE HYDROCHLORIDE 30 MG/1
10 TABLET ORAL EVERY 4 HOURS
Refills: 0 | Status: DISCONTINUED | OUTPATIENT
Start: 2024-10-27 | End: 2024-10-29

## 2024-10-27 RX ADMIN — DEXAMETHASONE 1.5 MG 4 MILLIGRAM(S): 1.5 TABLET ORAL at 18:17

## 2024-10-27 RX ADMIN — OXYCODONE HYDROCHLORIDE 10 MILLIGRAM(S): 30 TABLET ORAL at 15:20

## 2024-10-27 RX ADMIN — Medication 40 MILLIGRAM(S): at 21:27

## 2024-10-27 RX ADMIN — Medication 650 MILLIGRAM(S): at 22:15

## 2024-10-27 RX ADMIN — METHOCARBAMOL 750 MILLIGRAM(S): 500 TABLET ORAL at 06:09

## 2024-10-27 RX ADMIN — METHOCARBAMOL 500 MILLIGRAM(S): 500 TABLET ORAL at 12:03

## 2024-10-27 RX ADMIN — OXYCODONE HYDROCHLORIDE 10 MILLIGRAM(S): 30 TABLET ORAL at 19:12

## 2024-10-27 RX ADMIN — DEXAMETHASONE 1.5 MG 4 MILLIGRAM(S): 1.5 TABLET ORAL at 05:26

## 2024-10-27 RX ADMIN — OXYCODONE HYDROCHLORIDE 10 MILLIGRAM(S): 30 TABLET ORAL at 18:17

## 2024-10-27 RX ADMIN — METHOCARBAMOL 500 MILLIGRAM(S): 500 TABLET ORAL at 18:17

## 2024-10-27 RX ADMIN — OXYCODONE HYDROCHLORIDE 10 MILLIGRAM(S): 30 TABLET ORAL at 22:07

## 2024-10-27 RX ADMIN — Medication 1000 MILLIGRAM(S): at 13:45

## 2024-10-27 RX ADMIN — CEFEPIME 100 MILLIGRAM(S): 2 INJECTION, POWDER, FOR SOLUTION INTRAVENOUS at 14:24

## 2024-10-27 RX ADMIN — OXYCODONE HYDROCHLORIDE 10 MILLIGRAM(S): 30 TABLET ORAL at 10:16

## 2024-10-27 RX ADMIN — METHOCARBAMOL 500 MILLIGRAM(S): 500 TABLET ORAL at 23:05

## 2024-10-27 RX ADMIN — GABAPENTIN 300 MILLIGRAM(S): 300 CAPSULE ORAL at 05:27

## 2024-10-27 RX ADMIN — Medication 5 MILLIGRAM(S): at 12:03

## 2024-10-27 RX ADMIN — Medication 400 MILLIGRAM(S): at 12:58

## 2024-10-27 RX ADMIN — Medication 2 TABLET(S): at 21:27

## 2024-10-27 RX ADMIN — GABAPENTIN 300 MILLIGRAM(S): 300 CAPSULE ORAL at 21:26

## 2024-10-27 RX ADMIN — GABAPENTIN 300 MILLIGRAM(S): 300 CAPSULE ORAL at 14:24

## 2024-10-27 RX ADMIN — OXYCODONE HYDROCHLORIDE 10 MILLIGRAM(S): 30 TABLET ORAL at 23:07

## 2024-10-27 RX ADMIN — OXYCODONE HYDROCHLORIDE 10 MILLIGRAM(S): 30 TABLET ORAL at 11:15

## 2024-10-27 RX ADMIN — OXYCODONE HYDROCHLORIDE 10 MILLIGRAM(S): 30 TABLET ORAL at 14:24

## 2024-10-27 RX ADMIN — CEFEPIME 100 MILLIGRAM(S): 2 INJECTION, POWDER, FOR SOLUTION INTRAVENOUS at 05:26

## 2024-10-27 RX ADMIN — DEXAMETHASONE 1.5 MG 6 MILLIGRAM(S): 1.5 TABLET ORAL at 12:03

## 2024-10-27 RX ADMIN — Medication 650 MILLIGRAM(S): at 23:15

## 2024-10-27 RX ADMIN — Medication 30 MILLILITER(S): at 07:18

## 2024-10-27 RX ADMIN — DEXAMETHASONE 1.5 MG 4 MILLIGRAM(S): 1.5 TABLET ORAL at 23:05

## 2024-10-27 RX ADMIN — POLYETHYLENE GLYCOL 3350 17 GRAM(S): 17 POWDER, FOR SOLUTION ORAL at 05:26

## 2024-10-27 RX ADMIN — SODIUM CHLORIDE 500 MILLILITER(S): 9 INJECTION, SOLUTION INTRAMUSCULAR; INTRAVENOUS; SUBCUTANEOUS at 10:18

## 2024-10-27 RX ADMIN — POLYETHYLENE GLYCOL 3350 17 GRAM(S): 17 POWDER, FOR SOLUTION ORAL at 18:18

## 2024-10-27 NOTE — PROGRESS NOTE ADULT - SUBJECTIVE AND OBJECTIVE BOX
Day _3_ of Anesthesia Pain Management Service    SUBJECTIVE: Patient is doing well with IV PCA    Pain Scale Score:	[X] Refer to charted pain scores    THERAPY:    [ ] IV PCA Morphine		[ ] 5 mg/mL	[ ] 1 mg/mL  [X] IV PCA Hydromorphone	[ ] 5 mg/mL	[X] 1 mg/mL  [ ] IV PCA Fentanyl		[ ] 50 micrograms/mL    Demand dose: 0.2 mg     Lockout: 6 minutes   Continuous Rate: 0 mg/hr  4 Hour Limit: 4 mg    MEDICATIONS  (STANDING):  acetaminophen   IVPB .. 1000 milliGRAM(s) IV Intermittent once  bisacodyl 5 milliGRAM(s) Oral daily  cefepime   IVPB      cefepime   IVPB 1000 milliGRAM(s) IV Intermittent every 8 hours  dexAMETHasone  Injectable 6 milliGRAM(s) IV Push once  dexAMETHasone  Injectable 4 milliGRAM(s) IV Push every 6 hours  enoxaparin Injectable 40 milliGRAM(s) SubCutaneous every 24 hours  gabapentin 300 milliGRAM(s) Oral three times a day  influenza   Vaccine 0.5 milliLiter(s) IntraMuscular once  methocarbamol 750 milliGRAM(s) Oral every 8 hours  polyethylene glycol 3350 17 Gram(s) Oral two times a day  senna 2 Tablet(s) Oral at bedtime  sodium chloride 0.9% Bolus 500 milliLiter(s) IV Bolus once    MEDICATIONS  (PRN):  calamine/zinc oxide Lotion 1 Application(s) Topical every 6 hours PRN Itching  diphenhydrAMINE Injectable 25 milliGRAM(s) IV Push every 6 hours PRN Rash and/or Itching  nalbuphine Injectable 2.5 milliGRAM(s) IV Push every 6 hours PRN Pruritus  naloxone Injectable 0.1 milliGRAM(s) IV Push every 3 minutes PRN For ANY of the following changes in patient status:  A. RR LESS THAN 10 breaths per minute, B. Oxygen saturation LESS THAN 90%, C. Sedation score of 6  ondansetron Injectable 4 milliGRAM(s) IV Push every 6 hours PRN Nausea  oxyCODONE    IR 5 milliGRAM(s) Oral every 4 hours PRN Moderate Pain (4 - 6)  oxyCODONE    IR 10 milliGRAM(s) Oral every 4 hours PRN Severe Pain (7 - 10)      OBJECTIVE:    Sedation Score:	[ X] Alert	[ ] Drowsy 	[ ] Arousable	[ ] Asleep	[ ] Unresponsive    Side Effects:	[X ] None	[ ] Nausea	[ ] Vomiting	[ ] Pruritus  		[ ] Other:    Vital Signs Last 24 Hrs  T(C): 36.7 (27 Oct 2024 07:50), Max: 37.2 (26 Oct 2024 20:15)  T(F): 98 (27 Oct 2024 07:50), Max: 98.9 (26 Oct 2024 20:15)  HR: 77 (27 Oct 2024 07:50) (69 - 116)  BP: 103/72 (27 Oct 2024 07:50) (95/63 - 105/55)  BP(mean): --  RR: 18 (27 Oct 2024 07:50) (18 - 20)  SpO2: 99% (27 Oct 2024 07:50) (97% - 100%)    Parameters below as of 27 Oct 2024 07:50  Patient On (Oxygen Delivery Method): room air        ASSESSMENT/ PLAN    Therapy to  be:               [X] Continued   [ ] Discontinued   [ ] Changed to PRN Analgesics    Documentation and Verification of current medications:   [X] Done	[ ] Not done, not eligible    Comments:
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
## 2024-10-27 NOTE — PROGRESS NOTE ADULT - TIME BILLING
Patient has persistent numbness in R calf and foot.  Had RRT last night due to shortness of breath, may have been due to anxiety. Had T101.1 during RRT. ID w/u negative thus far.  Will obtain CT L-spine.  NANCY AG hemovac. Patient has persistent numbness in R calf and foot.  Had RRT last night due to shortness of breath, may have been due to anxiety. Had T101.1 during RRT. ID w/u negative thus far.  Will obtain CT L-spine to evaluate RLE numbness.  NANCY AG hemovac.
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
## 2024-10-27 NOTE — CONSULT NOTE ADULT - TIME BILLING
reviewing documentation, reviewing and interpreting labs/imaging, interviewing and examining patient, discussing plan of care with patient, documentation, coordinating care with NSx
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
## 2024-10-27 NOTE — PROGRESS NOTE ADULT - SUBJECTIVE AND OBJECTIVE BOX
SUBJECTIVE: patient in chair, c/o persistent numbness of right below knee and mostly the foot; more comfortable overall than yesterday, febrile overngiht, workup in progress  +PCA/fluids, drains x 2     OVERNIGHT: RRT events noted, rectal temp 101 noted at time of RRT    Vital Signs Last 24 Hrs  T(C): 36.8 (27 Oct 2024 16:50), Max: 37.2 (26 Oct 2024 20:15)  T(F): 98.3 (27 Oct 2024 16:50), Max: 98.9 (26 Oct 2024 20:15)  HR: 86 (27 Oct 2024 16:50) (69 - 116)  BP: 118/74 (27 Oct 2024 16:50) (98/64 - 118/74)  BP(mean): --  RR: 18 (27 Oct 2024 16:50) (18 - 20)  SpO2: 97% (27 Oct 2024 16:50) (97% - 100%)    Parameters below as of 27 Oct 2024 16:50  Patient On (Oxygen Delivery Method): room air    DRAINS:   L HMV 70 CC/24h   R HMV 10 CC/ 24h    PHYSICAL EXAM:    General: No Acute Distress     Neurological: Awake, alert oriented to person, place and time, Following Commands, PERRL, EOMI, Face Symmetrical, Speech Fluent, Moving all extremities, Muscle Strength normal in all four extremities, No Drift, Sensation to Light Touch Intact except R LE below knee and sole of foot     Pulmonary: Clear to Auscultation, No Rales, No Rhonchi, No Wheezes     Cardiovascular: S1, S2, Regular Rate and Rhythm     Gastrointestinal: Soft, Nontender, Nondistended     Incision:   clean and dry; drains x 2 +dressing; Right ICBG drain removed wihtout difficulty    LABS:                                              8.7    10.83 )-----------( 282      ( 27 Oct 2024 07:26 )             27.9   10-27    137  |  104  |  <4[L]  ----------------------------<  130[H]  3.8   |  22  |  0.48[L]    Ca    8.9      27 Oct 2024 07:28    TPro  6.6  /  Alb  3.3  /  TBili  0.3  /  DBili  x   /  AST  46[H]  /  ALT  25  /  AlkPhos  74  10-26    Urinalysis (10.26.24 @ 07:16)    pH Urine: 7.5   Glucose Qualitative, Urine: Negative mg/dL   Blood, Urine: Negative   Color: Yellow   Urine Appearance: Clear   Bilirubin: Negative   Ketone - Urine: Negative mg/dL   Specific Gravity: 1.007   Protein, Urine: Negative mg/dL   Urobilinogen: 0.2 mg/dL   Nitrite: Negative   Leukocyte Esterase Concentration: Negative          < from: Xray Chest 1 View AP/PA (10.26.24 @ 07:40) >  IMPRESSION:  Clear lungs.    < end of copied text >  < from: [facility] Duplex Lower Ext Vein Scan, Bilat (10.25.24 @ 15:05) >  IMPRESSION:  No evidence of deep venous thrombosis in either lower extremity.    < end of copied text >    MEDICATIONS  (STANDING):  bisacodyl 5 milliGRAM(s) Oral daily  dexAMETHasone  Injectable 4 milliGRAM(s) IV Push every 6 hours  enoxaparin Injectable 40 milliGRAM(s) SubCutaneous every 24 hours  gabapentin 300 milliGRAM(s) Oral three times a day  influenza   Vaccine 0.5 milliLiter(s) IntraMuscular once  methocarbamol 500 milliGRAM(s) Oral every 6 hours  polyethylene glycol 3350 17 Gram(s) Oral two times a day  senna 2 Tablet(s) Oral at bedtime    MEDICATIONS  (PRN):  calamine/zinc oxide Lotion 1 Application(s) Topical every 6 hours PRN Itching  diphenhydrAMINE Injectable 25 milliGRAM(s) IV Push every 6 hours PRN Rash and/or Itching  nalbuphine Injectable 2.5 milliGRAM(s) IV Push every 6 hours PRN Pruritus  naloxone Injectable 0.1 milliGRAM(s) IV Push every 3 minutes PRN For ANY of the following changes in patient status:  A. RR LESS THAN 10 breaths per minute, B. Oxygen saturation LESS THAN 90%, C. Sedation score of 6  ondansetron Injectable 4 milliGRAM(s) IV Push every 6 hours PRN Nausea  oxyCODONE    IR 10 milliGRAM(s) Oral every 4 hours PRN Severe Pain (7 - 10)  oxyCODONE    IR 5 milliGRAM(s) Oral every 4 hours PRN Moderate Pain (4 - 6)    DIET: [x] Regular [] CCD [] Renal [] Puree [] Dysphagia [] Tube Feeds:

## 2024-10-27 NOTE — CONSULT NOTE ADULT - PROBLEM SELECTOR RECOMMENDATION 9
unclear etiology - associated with acute episode of sob while in bed   no infectious symptoms - repeat UA not c/w infection. if febrile, would check bcx.   placed on empiric cefepime - recommended to monitor off abx per ID  she has been having significant R LE numbness but has been ambulating sating well on room air  exertional dyspnea likely due to deconditioning improving   LE duplex recently done and negative for DVT  continue to monitor
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
## 2024-10-28 LAB
ANION GAP SERPL CALC-SCNC: 14 MMOL/L — SIGNIFICANT CHANGE UP (ref 5–17)
BASOPHILS # BLD AUTO: 0.01 K/UL — SIGNIFICANT CHANGE UP (ref 0–0.2)
BASOPHILS NFR BLD AUTO: 0.1 % — SIGNIFICANT CHANGE UP (ref 0–2)
BUN SERPL-MCNC: 7 MG/DL — SIGNIFICANT CHANGE UP (ref 7–23)
CALCIUM SERPL-MCNC: 9.2 MG/DL — SIGNIFICANT CHANGE UP (ref 8.4–10.5)
CHLORIDE SERPL-SCNC: 103 MMOL/L — SIGNIFICANT CHANGE UP (ref 96–108)
CO2 SERPL-SCNC: 22 MMOL/L — SIGNIFICANT CHANGE UP (ref 22–31)
CREAT SERPL-MCNC: 0.54 MG/DL — SIGNIFICANT CHANGE UP (ref 0.5–1.3)
EGFR: 123 ML/MIN/1.73M2 — SIGNIFICANT CHANGE UP
EOSINOPHIL # BLD AUTO: 0 K/UL — SIGNIFICANT CHANGE UP (ref 0–0.5)
EOSINOPHIL NFR BLD AUTO: 0 % — SIGNIFICANT CHANGE UP (ref 0–6)
GLUCOSE SERPL-MCNC: 106 MG/DL — HIGH (ref 70–99)
HCT VFR BLD CALC: 28.2 % — LOW (ref 34.5–45)
HGB BLD-MCNC: 8.8 G/DL — LOW (ref 11.5–15.5)
IMM GRANULOCYTES NFR BLD AUTO: 0.7 % — SIGNIFICANT CHANGE UP (ref 0–0.9)
LYMPHOCYTES # BLD AUTO: 0.98 K/UL — LOW (ref 1–3.3)
LYMPHOCYTES # BLD AUTO: 6.7 % — LOW (ref 13–44)
MCHC RBC-ENTMCNC: 25.9 PG — LOW (ref 27–34)
MCHC RBC-ENTMCNC: 31.2 GM/DL — LOW (ref 32–36)
MCV RBC AUTO: 82.9 FL — SIGNIFICANT CHANGE UP (ref 80–100)
MONOCYTES # BLD AUTO: 0.39 K/UL — SIGNIFICANT CHANGE UP (ref 0–0.9)
MONOCYTES NFR BLD AUTO: 2.7 % — SIGNIFICANT CHANGE UP (ref 2–14)
NEUTROPHILS # BLD AUTO: 13.22 K/UL — HIGH (ref 1.8–7.4)
NEUTROPHILS NFR BLD AUTO: 89.8 % — HIGH (ref 43–77)
NRBC # BLD: 0 /100 WBCS — SIGNIFICANT CHANGE UP (ref 0–0)
PLATELET # BLD AUTO: 357 K/UL — SIGNIFICANT CHANGE UP (ref 150–400)
POTASSIUM SERPL-MCNC: 3.8 MMOL/L — SIGNIFICANT CHANGE UP (ref 3.5–5.3)
POTASSIUM SERPL-SCNC: 3.8 MMOL/L — SIGNIFICANT CHANGE UP (ref 3.5–5.3)
RBC # BLD: 3.4 M/UL — LOW (ref 3.8–5.2)
RBC # FLD: 17.3 % — HIGH (ref 10.3–14.5)
SODIUM SERPL-SCNC: 139 MMOL/L — SIGNIFICANT CHANGE UP (ref 135–145)
WBC # BLD: 14.71 K/UL — HIGH (ref 3.8–10.5)
WBC # FLD AUTO: 14.71 K/UL — HIGH (ref 3.8–10.5)

## 2024-10-28 PROCEDURE — 72131 CT LUMBAR SPINE W/O DYE: CPT | Mod: 26

## 2024-10-28 PROCEDURE — 99233 SBSQ HOSP IP/OBS HIGH 50: CPT

## 2024-10-28 PROCEDURE — 71275 CT ANGIOGRAPHY CHEST: CPT | Mod: 26

## 2024-10-28 RX ORDER — SODIUM CHLORIDE 9 MG/ML
4 INJECTION, SOLUTION INTRAMUSCULAR; INTRAVENOUS; SUBCUTANEOUS EVERY 12 HOURS
Refills: 0 | Status: DISCONTINUED | OUTPATIENT
Start: 2024-10-28 | End: 2024-10-29

## 2024-10-28 RX ORDER — GABAPENTIN 300 MG/1
400 CAPSULE ORAL EVERY 8 HOURS
Refills: 0 | Status: DISCONTINUED | OUTPATIENT
Start: 2024-10-28 | End: 2024-11-01

## 2024-10-28 RX ORDER — ACETAMINOPHEN 500 MG
1000 TABLET ORAL ONCE
Refills: 0 | Status: COMPLETED | OUTPATIENT
Start: 2024-10-28 | End: 2024-10-28

## 2024-10-28 RX ADMIN — GABAPENTIN 400 MILLIGRAM(S): 300 CAPSULE ORAL at 13:21

## 2024-10-28 RX ADMIN — Medication 400 MILLIGRAM(S): at 18:12

## 2024-10-28 RX ADMIN — OXYCODONE HYDROCHLORIDE 10 MILLIGRAM(S): 30 TABLET ORAL at 06:49

## 2024-10-28 RX ADMIN — Medication 5 MILLIGRAM(S): at 11:15

## 2024-10-28 RX ADMIN — OXYCODONE HYDROCHLORIDE 10 MILLIGRAM(S): 30 TABLET ORAL at 21:30

## 2024-10-28 RX ADMIN — POLYETHYLENE GLYCOL 3350 17 GRAM(S): 17 POWDER, FOR SOLUTION ORAL at 05:06

## 2024-10-28 RX ADMIN — OXYCODONE HYDROCHLORIDE 10 MILLIGRAM(S): 30 TABLET ORAL at 14:19

## 2024-10-28 RX ADMIN — POLYETHYLENE GLYCOL 3350 17 GRAM(S): 17 POWDER, FOR SOLUTION ORAL at 18:11

## 2024-10-28 RX ADMIN — OXYCODONE HYDROCHLORIDE 10 MILLIGRAM(S): 30 TABLET ORAL at 02:11

## 2024-10-28 RX ADMIN — OXYCODONE HYDROCHLORIDE 10 MILLIGRAM(S): 30 TABLET ORAL at 03:07

## 2024-10-28 RX ADMIN — OXYCODONE HYDROCHLORIDE 10 MILLIGRAM(S): 30 TABLET ORAL at 10:21

## 2024-10-28 RX ADMIN — GABAPENTIN 300 MILLIGRAM(S): 300 CAPSULE ORAL at 05:06

## 2024-10-28 RX ADMIN — METHOCARBAMOL 500 MILLIGRAM(S): 500 TABLET ORAL at 05:06

## 2024-10-28 RX ADMIN — METHOCARBAMOL 500 MILLIGRAM(S): 500 TABLET ORAL at 11:14

## 2024-10-28 RX ADMIN — Medication 2 TABLET(S): at 21:30

## 2024-10-28 RX ADMIN — OXYCODONE HYDROCHLORIDE 10 MILLIGRAM(S): 30 TABLET ORAL at 15:19

## 2024-10-28 RX ADMIN — OXYCODONE HYDROCHLORIDE 10 MILLIGRAM(S): 30 TABLET ORAL at 06:11

## 2024-10-28 RX ADMIN — DEXAMETHASONE 1.5 MG 4 MILLIGRAM(S): 1.5 TABLET ORAL at 11:14

## 2024-10-28 RX ADMIN — DEXAMETHASONE 1.5 MG 4 MILLIGRAM(S): 1.5 TABLET ORAL at 05:10

## 2024-10-28 RX ADMIN — Medication 40 MILLIGRAM(S): at 21:31

## 2024-10-28 RX ADMIN — Medication 650 MILLIGRAM(S): at 12:14

## 2024-10-28 RX ADMIN — OXYCODONE HYDROCHLORIDE 10 MILLIGRAM(S): 30 TABLET ORAL at 22:30

## 2024-10-28 RX ADMIN — Medication 650 MILLIGRAM(S): at 11:14

## 2024-10-28 RX ADMIN — METHOCARBAMOL 500 MILLIGRAM(S): 500 TABLET ORAL at 18:11

## 2024-10-28 RX ADMIN — OXYCODONE HYDROCHLORIDE 10 MILLIGRAM(S): 30 TABLET ORAL at 11:21

## 2024-10-28 RX ADMIN — Medication 650 MILLIGRAM(S): at 06:06

## 2024-10-28 RX ADMIN — GABAPENTIN 400 MILLIGRAM(S): 300 CAPSULE ORAL at 21:30

## 2024-10-28 RX ADMIN — Medication 1000 MILLIGRAM(S): at 18:42

## 2024-10-28 RX ADMIN — Medication 650 MILLIGRAM(S): at 05:06

## 2024-10-28 NOTE — PROGRESS NOTE ADULT - PROBLEM SELECTOR PLAN 3
Lovenox for DVT prophylaxis. Lovenox for DVT prophylaxis.    Reports no BM for a week- consider suppository.

## 2024-10-28 NOTE — PROGRESS NOTE ADULT - SUBJECTIVE AND OBJECTIVE BOX
SUBJECTIVE: patient in chair, c/o persistent numbness of right below knee and mostly the foot;   more comfortable overall than yesterday, afebrile-  workup neg so far  drain x1     Vital Signs Last 24 Hrs  T(C): 36.5 (28 Oct 2024 16:07), Max: 36.9 (27 Oct 2024 19:42)  T(F): 97.7 (28 Oct 2024 16:07), Max: 98.5 (27 Oct 2024 19:42)  HR: 90 (28 Oct 2024 16:07) (60 - 97)  BP: 107/70 (28 Oct 2024 16:07) (100/65 - 122/75)  BP(mean): --  RR: 18 (28 Oct 2024 16:07) (18 - 18)  SpO2: 99% (28 Oct 2024 16:07) (97% - 99%)    Parameters below as of 28 Oct 2024 16:07  Patient On (Oxygen Delivery Method): room air    DRAINS:   L HMV 30 CC/24h     PHYSICAL EXAM:    General: No Acute Distress     Neurological: Awake, alert oriented to person, place and time, Following Commands, PERRL, EOMI, Face Symmetrical, Speech Fluent, Moving all extremities,   Muscle Strength normal in all four extremities, No Drift, Sensation to Light Touch Intact except R LE below knee and sole of foot     Pulmonary: Clear to Auscultation, No Rales, No Rhonchi, No Wheezes     Cardiovascular: S1, S2, Regular Rate and Rhythm     Gastrointestinal: Soft, Nontender, Nondistended     Incision:   clean and dry; drains x 1 +dressing; Right ICBG drain removed without difficulty yesterday, drain x 1 in place    LABS:                                          8.8    14.71 )-----------( 357      ( 28 Oct 2024 09:14 )             28.2   10-28    139  |  103  |  7   ----------------------------<  106[H]  3.8   |  22  |  0.54    Ca    9.2      28 Oct 2024 09:14    TPro  6.6  /  Alb  3.3  /  TBili  0.3  /  DBili  x   /  AST  46[H]  /  ALT  25  /  AlkPhos  74  10-26         Urinalysis (10.26.24 @ 07:16)    pH Urine: 7.5   Glucose Qualitative, Urine: Negative mg/dL   Blood, Urine: Negative   Color: Yellow   Urine Appearance: Clear   Bilirubin: Negative   Ketone - Urine: Negative mg/dL   Specific Gravity: 1.007   Protein, Urine: Negative mg/dL   Urobilinogen: 0.2 mg/dL   Nitrite: Negative   Leukocyte Esterase Concentration: Negative      < from: Xray Chest 1 View AP/PA (10.26.24 @ 07:40) >  IMPRESSION:  Clear lungs.    < end of copied text >  < from: [facility] Duplex Lower Ext Vein Scan, Bilat (10.25.24 @ 15:05) >  IMPRESSION:  No evidence of deep venous thrombosis in either lower extremity.    < end of copied text >    MEDICATIONS  (STANDING):  bisacodyl 5 milliGRAM(s) Oral daily  enoxaparin Injectable 40 milliGRAM(s) SubCutaneous every 24 hours  gabapentin 400 milliGRAM(s) Oral every 8 hours  influenza   Vaccine 0.5 milliLiter(s) IntraMuscular once  methocarbamol 500 milliGRAM(s) Oral every 6 hours  polyethylene glycol 3350 17 Gram(s) Oral two times a day  senna 2 Tablet(s) Oral at bedtime  sodium chloride 3%  Inhalation 4 milliLiter(s) Inhalation every 12 hours    MEDICATIONS  (PRN):  acetaminophen     Tablet .. 650 milliGRAM(s) Oral every 6 hours PRN Temp greater or equal to 38C (100.4F), Mild Pain (1 - 3)  calamine/zinc oxide Lotion 1 Application(s) Topical every 6 hours PRN Itching  diphenhydrAMINE Injectable 25 milliGRAM(s) IV Push every 6 hours PRN Rash and/or Itching  nalbuphine Injectable 2.5 milliGRAM(s) IV Push every 6 hours PRN Pruritus  naloxone Injectable 0.1 milliGRAM(s) IV Push every 3 minutes PRN For ANY of the following changes in patient status:  A. RR LESS THAN 10 breaths per minute, B. Oxygen saturation LESS THAN 90%, C. Sedation score of 6  ondansetron Injectable 4 milliGRAM(s) IV Push every 6 hours PRN Nausea  oxyCODONE    IR 10 milliGRAM(s) Oral every 4 hours PRN Severe Pain (7 - 10)  oxyCODONE    IR 5 milliGRAM(s) Oral every 4 hours PRN Moderate Pain (4 - 6)    DIET: [x] Regular [] CCD [] Renal [] Puree [] Dysphagia [] Tube Feeds:
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
## 2024-10-28 NOTE — PROGRESS NOTE ADULT - TIME BILLING
Patient stable neurologically.  Continues to have right anterior shin numbness which extends to the top of the right foot. Motor 5/5.  Gabapentin 400 mg Q8h  R hemovac DC'ed  CT L-spine demonstrates post-op changes with acceptable implant placement and spinal alignment. CTA chest result pending.  Case d/w pt and  and Nurse Manager Hayden.  NANCY planning.
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
## 2024-10-28 NOTE — PROGRESS NOTE ADULT - SUBJECTIVE AND OBJECTIVE BOX
[facility], Division of Infectious   Dr West, Dr Red, Dr Ayala, KORI Jimenez Jake  [phone]  after hours and weekends [phone]    Name: SILVINA KUMAR  Age: 34y  Gender: Female  MRN: [id number]    Interval History--  Notes reviewed  had what felt like a panic attack after walking with pt  fingers felt numb was a bit dizzy  has pain down right leg a      Allergies    penicillins (Rash)    Intolerances        Medications--  Antibiotics:    Immunologic:  influenza   Vaccine 0.5 milliLiter(s) IntraMuscular once    Other:  acetaminophen     Tablet .. PRN  bisacodyl  calamine/zinc oxide Lotion PRN  diphenhydrAMINE Injectable PRN  enoxaparin Injectable  gabapentin  methocarbamol  nalbuphine Injectable PRN  naloxone Injectable PRN  ondansetron Injectable PRN  oxyCODONE    IR PRN  oxyCODONE    IR PRN  polyethylene glycol 3350  senna      Review of Systems--  A 10-point review of systems was obtained.     Pertinent positives and negatives--  Constitutional: No fevers. No Chills. No Rigors.   Cardiovascular: No chest pain. No palpitations.  Respiratory: No shortness of breath. No cough.  Gastrointestinal: No nausea or vomiting. No diarrhea or constipation.   Psychiatric: Pleasant. Appropriate affect.    Review of systems otherwise negative except as previously noted.    Physical Examination--  Vital Signs: T(F): 97.9 (10-28-24 @ 13:16), Max: 98.5 (10-27-24 @ 19:42)  HR: 97 (10-28-24 @ 11:56)  BP: 122/75 (10-28-24 @ 11:56)  RR: 18 (10-28-24 @ 11:56)  SpO2: 99% (10-28-24 @ 11:56)  Wt(kg): --  General: Nontoxic-appearing Female in no acute distress.  HEENT: AT/NC.   Neck: Not rigid. No sense of mass.  Nodes: None palpable.  Lungs: Clear bilaterally without rales, wheezing or rhonchi  Heart: Regular rate and rhythm.   Abdomen: Bowel sounds present and normoactive. Soft. Nondistended. Nontender.   Back: No spinal tenderness. No costovertebral angle tenderness. + drain   Extremities: No cyanosis or clubbing. No edema.   Skin: Warm. Dry. Good turgor. No rash. No vasculitic stigmata.  Psychiatric: Appropriate affect and mood for situation.         Laboratory Studies--  CBC                        8.8    14.71 )-----------( 357      ( 28 Oct 2024 09:14 )             28.2       Chemistries  10-28    139  |  103  |  7   ----------------------------<  106[H]  3.8   |  22  |  0.54    Ca    9.2      28 Oct 2024 09:14    TPro  6.6  /  Alb  3.3  /  TBili  0.3  /  DBili  x   /  AST  46[H]  /  ALT  25  /  AlkPhos  74  10-26      Culture Data    Culture - Blood (collected 26 Oct 2024 05:20)  Source: .Blood BLOOD  Preliminary Report (28 Oct 2024 11:03):    No growth at 48 Hours    Culture - Blood (collected 26 Oct 2024 05:10)  Source: .Blood BLOOD  Preliminary Report (28 Oct 2024 11:03):    No growth at 48 Hours    < from: Xray Chest 1 View AP/PA (10.26.24 @ 07:40) >      INTERPRETATION:  EXAMINATION: XR CHEST    CLINICAL INDICATION: Fever    TECHNIQUE: Single frontal, portable view of the chest was obtained.    COMPARISON: None.    FINDINGS:  The heart is not accurately assessed in this AP projection.  The lungs are clear.  There is no pneumothorax or pleural effusion.  No acute bony abnormality.    IMPRESSION:  Clear lungs.    < end of copied text >

## 2024-10-29 DIAGNOSIS — R55 SYNCOPE AND COLLAPSE: ICD-10-CM

## 2024-10-29 LAB
ALBUMIN SERPL ELPH-MCNC: 3.3 G/DL — SIGNIFICANT CHANGE UP (ref 3.3–5)
ALP SERPL-CCNC: 65 U/L — SIGNIFICANT CHANGE UP (ref 40–120)
ALT FLD-CCNC: 24 U/L — SIGNIFICANT CHANGE UP (ref 10–45)
ANION GAP SERPL CALC-SCNC: 11 MMOL/L — SIGNIFICANT CHANGE UP (ref 5–17)
ANION GAP SERPL CALC-SCNC: 13 MMOL/L — SIGNIFICANT CHANGE UP (ref 5–17)
AST SERPL-CCNC: 18 U/L — SIGNIFICANT CHANGE UP (ref 10–40)
BASOPHILS # BLD AUTO: 0.01 K/UL — SIGNIFICANT CHANGE UP (ref 0–0.2)
BASOPHILS NFR BLD AUTO: 0.1 % — SIGNIFICANT CHANGE UP (ref 0–2)
BILIRUB SERPL-MCNC: 0.2 MG/DL — SIGNIFICANT CHANGE UP (ref 0.2–1.2)
BLD GP AB SCN SERPL QL: NEGATIVE — SIGNIFICANT CHANGE UP
BUN SERPL-MCNC: 11 MG/DL — SIGNIFICANT CHANGE UP (ref 7–23)
BUN SERPL-MCNC: 14 MG/DL — SIGNIFICANT CHANGE UP (ref 7–23)
CALCIUM SERPL-MCNC: 8.4 MG/DL — SIGNIFICANT CHANGE UP (ref 8.4–10.5)
CALCIUM SERPL-MCNC: 8.7 MG/DL — SIGNIFICANT CHANGE UP (ref 8.4–10.5)
CHLORIDE SERPL-SCNC: 103 MMOL/L — SIGNIFICANT CHANGE UP (ref 96–108)
CHLORIDE SERPL-SCNC: 106 MMOL/L — SIGNIFICANT CHANGE UP (ref 96–108)
CO2 SERPL-SCNC: 21 MMOL/L — LOW (ref 22–31)
CO2 SERPL-SCNC: 25 MMOL/L — SIGNIFICANT CHANGE UP (ref 22–31)
CREAT SERPL-MCNC: 0.58 MG/DL — SIGNIFICANT CHANGE UP (ref 0.5–1.3)
CREAT SERPL-MCNC: 0.84 MG/DL — SIGNIFICANT CHANGE UP (ref 0.5–1.3)
EGFR: 121 ML/MIN/1.73M2 — SIGNIFICANT CHANGE UP
EGFR: 93 ML/MIN/1.73M2 — SIGNIFICANT CHANGE UP
EOSINOPHIL # BLD AUTO: 0.28 K/UL — SIGNIFICANT CHANGE UP (ref 0–0.5)
EOSINOPHIL NFR BLD AUTO: 2.5 % — SIGNIFICANT CHANGE UP (ref 0–6)
GLUCOSE SERPL-MCNC: 113 MG/DL — HIGH (ref 70–99)
GLUCOSE SERPL-MCNC: 84 MG/DL — SIGNIFICANT CHANGE UP (ref 70–99)
HCT VFR BLD CALC: 24.9 % — LOW (ref 34.5–45)
HCT VFR BLD CALC: 27.1 % — LOW (ref 34.5–45)
HGB BLD-MCNC: 8 G/DL — LOW (ref 11.5–15.5)
HGB BLD-MCNC: 8.3 G/DL — LOW (ref 11.5–15.5)
IMM GRANULOCYTES NFR BLD AUTO: 0.6 % — SIGNIFICANT CHANGE UP (ref 0–0.9)
LACTATE SERPL-SCNC: 0.9 MMOL/L — SIGNIFICANT CHANGE UP (ref 0.5–2)
LYMPHOCYTES # BLD AUTO: 2.38 K/UL — SIGNIFICANT CHANGE UP (ref 1–3.3)
LYMPHOCYTES # BLD AUTO: 21.2 % — SIGNIFICANT CHANGE UP (ref 13–44)
MCHC RBC-ENTMCNC: 25.3 PG — LOW (ref 27–34)
MCHC RBC-ENTMCNC: 26.8 PG — LOW (ref 27–34)
MCHC RBC-ENTMCNC: 30.6 G/DL — LOW (ref 32–36)
MCHC RBC-ENTMCNC: 32.1 GM/DL — SIGNIFICANT CHANGE UP (ref 32–36)
MCV RBC AUTO: 82.6 FL — SIGNIFICANT CHANGE UP (ref 80–100)
MCV RBC AUTO: 83.3 FL — SIGNIFICANT CHANGE UP (ref 80–100)
MONOCYTES # BLD AUTO: 0.57 K/UL — SIGNIFICANT CHANGE UP (ref 0–0.9)
MONOCYTES NFR BLD AUTO: 5.1 % — SIGNIFICANT CHANGE UP (ref 2–14)
NEUTROPHILS # BLD AUTO: 7.9 K/UL — HIGH (ref 1.8–7.4)
NEUTROPHILS NFR BLD AUTO: 70.5 % — SIGNIFICANT CHANGE UP (ref 43–77)
NRBC # BLD: 0 /100 WBCS — SIGNIFICANT CHANGE UP (ref 0–0)
NRBC # BLD: 0 /100 WBCS — SIGNIFICANT CHANGE UP (ref 0–0)
PLATELET # BLD AUTO: 328 K/UL — SIGNIFICANT CHANGE UP (ref 150–400)
PLATELET # BLD AUTO: 350 K/UL — SIGNIFICANT CHANGE UP (ref 150–400)
POTASSIUM SERPL-MCNC: 3.5 MMOL/L — SIGNIFICANT CHANGE UP (ref 3.5–5.3)
POTASSIUM SERPL-MCNC: 3.7 MMOL/L — SIGNIFICANT CHANGE UP (ref 3.5–5.3)
POTASSIUM SERPL-SCNC: 3.5 MMOL/L — SIGNIFICANT CHANGE UP (ref 3.5–5.3)
POTASSIUM SERPL-SCNC: 3.7 MMOL/L — SIGNIFICANT CHANGE UP (ref 3.5–5.3)
PROT SERPL-MCNC: 5.9 G/DL — LOW (ref 6–8.3)
RBC # BLD: 2.99 M/UL — LOW (ref 3.8–5.2)
RBC # BLD: 3.28 M/UL — LOW (ref 3.8–5.2)
RBC # FLD: 17.3 % — HIGH (ref 10.3–14.5)
RBC # FLD: 17.5 % — HIGH (ref 10.3–14.5)
RH IG SCN BLD-IMP: NEGATIVE — SIGNIFICANT CHANGE UP
SODIUM SERPL-SCNC: 139 MMOL/L — SIGNIFICANT CHANGE UP (ref 135–145)
SODIUM SERPL-SCNC: 140 MMOL/L — SIGNIFICANT CHANGE UP (ref 135–145)
WBC # BLD: 10.89 K/UL — HIGH (ref 3.8–10.5)
WBC # BLD: 11.21 K/UL — HIGH (ref 3.8–10.5)
WBC # FLD AUTO: 10.89 K/UL — HIGH (ref 3.8–10.5)
WBC # FLD AUTO: 11.21 K/UL — HIGH (ref 3.8–10.5)

## 2024-10-29 PROCEDURE — 74177 CT ABD & PELVIS W/CONTRAST: CPT | Mod: 26

## 2024-10-29 PROCEDURE — 93010 ELECTROCARDIOGRAM REPORT: CPT

## 2024-10-29 PROCEDURE — 99233 SBSQ HOSP IP/OBS HIGH 50: CPT

## 2024-10-29 PROCEDURE — 70450 CT HEAD/BRAIN W/O DYE: CPT | Mod: 26

## 2024-10-29 PROCEDURE — 73560 X-RAY EXAM OF KNEE 1 OR 2: CPT | Mod: 26,LT,RT

## 2024-10-29 RX ORDER — SODIUM CHLORIDE 9 MG/ML
1000 INJECTION, SOLUTION INTRAMUSCULAR; INTRAVENOUS; SUBCUTANEOUS ONCE
Refills: 0 | Status: COMPLETED | OUTPATIENT
Start: 2024-10-29 | End: 2024-10-29

## 2024-10-29 RX ORDER — POTASSIUM CHLORIDE 10 MEQ
40 TABLET, EXTENDED RELEASE ORAL ONCE
Refills: 0 | Status: COMPLETED | OUTPATIENT
Start: 2024-10-29 | End: 2024-10-29

## 2024-10-29 RX ORDER — CHLORHEXIDINE GLUCONATE 40 MG/ML
1 SOLUTION TOPICAL DAILY
Refills: 0 | Status: DISCONTINUED | OUTPATIENT
Start: 2024-10-29 | End: 2024-10-30

## 2024-10-29 RX ORDER — SODIUM CHLORIDE 9 MG/ML
500 INJECTION, SOLUTION INTRAMUSCULAR; INTRAVENOUS; SUBCUTANEOUS ONCE
Refills: 0 | Status: COMPLETED | OUTPATIENT
Start: 2024-10-29 | End: 2024-10-29

## 2024-10-29 RX ORDER — MAGNESIUM CITRATE
296 SOLUTION, ORAL ORAL ONCE
Refills: 0 | Status: COMPLETED | OUTPATIENT
Start: 2024-10-29 | End: 2024-10-29

## 2024-10-29 RX ORDER — POTASSIUM CHLORIDE 10 MEQ
20 TABLET, EXTENDED RELEASE ORAL
Refills: 0 | Status: COMPLETED | OUTPATIENT
Start: 2024-10-29 | End: 2024-10-29

## 2024-10-29 RX ORDER — HYDROMORPHONE HCL/0.9% NACL/PF 6 MG/30 ML
0.5 PATIENT CONTROLLED ANALGESIA SYRINGE INTRAVENOUS ONCE
Refills: 0 | Status: DISCONTINUED | OUTPATIENT
Start: 2024-10-29 | End: 2024-10-29

## 2024-10-29 RX ORDER — ENOXAPARIN SODIUM 40MG/0.4ML
40 SYRINGE (ML) SUBCUTANEOUS EVERY 24 HOURS
Refills: 0 | Status: DISCONTINUED | OUTPATIENT
Start: 2024-10-29 | End: 2024-11-01

## 2024-10-29 RX ORDER — ACETAMINOPHEN 500 MG
1000 TABLET ORAL ONCE
Refills: 0 | Status: COMPLETED | OUTPATIENT
Start: 2024-10-29 | End: 2024-10-29

## 2024-10-29 RX ADMIN — SODIUM CHLORIDE 1000 MILLILITER(S): 9 INJECTION, SOLUTION INTRAMUSCULAR; INTRAVENOUS; SUBCUTANEOUS at 19:48

## 2024-10-29 RX ADMIN — OXYCODONE HYDROCHLORIDE 10 MILLIGRAM(S): 30 TABLET ORAL at 11:28

## 2024-10-29 RX ADMIN — Medication 20 MILLIEQUIVALENT(S): at 13:40

## 2024-10-29 RX ADMIN — Medication 0.5 MILLIGRAM(S): at 19:15

## 2024-10-29 RX ADMIN — OXYCODONE HYDROCHLORIDE 10 MILLIGRAM(S): 30 TABLET ORAL at 04:32

## 2024-10-29 RX ADMIN — Medication 2 TABLET(S): at 23:03

## 2024-10-29 RX ADMIN — Medication 40 MILLIGRAM(S): at 23:03

## 2024-10-29 RX ADMIN — Medication 20 MILLIEQUIVALENT(S): at 09:34

## 2024-10-29 RX ADMIN — Medication 75 MILLILITER(S): at 19:48

## 2024-10-29 RX ADMIN — SODIUM CHLORIDE 1000 MILLILITER(S): 9 INJECTION, SOLUTION INTRAMUSCULAR; INTRAVENOUS; SUBCUTANEOUS at 16:33

## 2024-10-29 RX ADMIN — Medication 10 MILLIGRAM(S): at 11:28

## 2024-10-29 RX ADMIN — Medication 0.5 MILLIGRAM(S): at 19:00

## 2024-10-29 RX ADMIN — METHOCARBAMOL 500 MILLIGRAM(S): 500 TABLET ORAL at 05:39

## 2024-10-29 RX ADMIN — Medication 1000 MILLIGRAM(S): at 22:15

## 2024-10-29 RX ADMIN — Medication 296 MILLILITER(S): at 11:28

## 2024-10-29 RX ADMIN — SODIUM CHLORIDE 1000 MILLILITER(S): 9 INJECTION, SOLUTION INTRAMUSCULAR; INTRAVENOUS; SUBCUTANEOUS at 12:49

## 2024-10-29 RX ADMIN — Medication 40 MILLIEQUIVALENT(S): at 23:03

## 2024-10-29 RX ADMIN — GABAPENTIN 400 MILLIGRAM(S): 300 CAPSULE ORAL at 05:40

## 2024-10-29 RX ADMIN — METHOCARBAMOL 500 MILLIGRAM(S): 500 TABLET ORAL at 23:03

## 2024-10-29 RX ADMIN — OXYCODONE HYDROCHLORIDE 10 MILLIGRAM(S): 30 TABLET ORAL at 05:32

## 2024-10-29 RX ADMIN — CHLORHEXIDINE GLUCONATE 1 APPLICATION(S): 40 SOLUTION TOPICAL at 23:04

## 2024-10-29 RX ADMIN — METHOCARBAMOL 500 MILLIGRAM(S): 500 TABLET ORAL at 00:33

## 2024-10-29 RX ADMIN — POLYETHYLENE GLYCOL 3350 17 GRAM(S): 17 POWDER, FOR SOLUTION ORAL at 05:38

## 2024-10-29 RX ADMIN — SODIUM CHLORIDE 4 MILLILITER(S): 9 INJECTION, SOLUTION INTRAMUSCULAR; INTRAVENOUS; SUBCUTANEOUS at 05:40

## 2024-10-29 RX ADMIN — GABAPENTIN 400 MILLIGRAM(S): 300 CAPSULE ORAL at 23:02

## 2024-10-29 RX ADMIN — Medication 400 MILLIGRAM(S): at 22:00

## 2024-10-29 RX ADMIN — Medication 20 MILLIEQUIVALENT(S): at 11:28

## 2024-10-29 RX ADMIN — Medication 650 MILLIGRAM(S): at 11:29

## 2024-10-29 RX ADMIN — GABAPENTIN 400 MILLIGRAM(S): 300 CAPSULE ORAL at 13:40

## 2024-10-29 NOTE — PROVIDER CONTACT NOTE (OTHER) - ASSESSMENT
A&Ox4, VSS. Pt c/o feeling lightheaded which started 5 minutes ago. State, " I feel like my body is getting hot, like I am about to pass out". C/o back pain 4/10. No c/o CP, headache, or SOB.
Pt. VSS, pulses 2+ palpable, extremities warm
VSS except hypotensive w BP 80/54 and rectal temp 99.2 Pt lethargic but arousable. A&Ox4.
no BM since 10/24. Pt reports passing some flatus but feels bloated/ distended. Pt denies abdominal pain/ discomfort. No pain on palpation. Pt vdg well. A&Ox4. VSS.
pt hypotensive w BP 92/58, all other VSS. A&Ox4. Pt denies chest pain & SOB. No signs of distress noted. Pt denies feeling dizzy or lightheaded. Pt reports feeling "tired".
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
## 2024-10-29 NOTE — PROVIDER CONTACT NOTE (MEDICATION) - ASSESSMENT
pt c/o new numbness in R leg since having surgery. Team is already aware of new numbness in R leg but pt still complaining of numbness day 5 post-op. Pt unable to feel fine touch but when foot squeezed, she reports feeling the pressure. +2 pedal pulse b/l & is able to move foot, plantar & dorsi is moderate. Pt reports the numbness goes to mid calf & then she has full sensation above that. Neuro chk remains unchanged.

## 2024-10-29 NOTE — PROVIDER CONTACT NOTE (FALL NOTIFICATION) - ASSESSMENT
Pt  states pt fell to her knees while on the toilet in [place].  states he picked her up from floor and sat her back on toilet. Staff notified, pt noted to be sitting on toilet during assessment. Pt A&Ox4, Hypotensive w BP 87/53 w all other VSS. Pt states she is unsure if she hit her head, small red lan noted on forehead. Pt assisted from toilet back to bed. Pt educated on callbell & need for assistance when OOB. Bed alarm on & nursing care continued.

## 2024-10-29 NOTE — PROGRESS NOTE ADULT - TIME BILLING
Patient now in NSCU after episode of hyptotension (BP 80/50) unresponsive to fluid bolus. BP now improved.  Labs unremarkable. Afebrile. Patient now in NSCU after episode of hypotension (BP 80/50) unresponsive to fluid bolus. BP now improved. Pt s/p fall earlier today.  Labs unremarkable. Afebrile. WBC 10.9. Lactate 0.9.  CT A/P unremarkable.  Continue IVF and NSCU observation.  Case d/w pt and  and NSCU Fellow.
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
## 2024-10-29 NOTE — PROGRESS NOTE ADULT - ASSESSMENT
ASSESSMENT/PLAN: Hypotension s/p fall     NEURO:  NQ4, VSQ4  RCTH post-fall: negative for any trauma/acute pathology  Tylenol + Oxy prn for pain  Activity: [] OOB as tolerated [] Bedrest [] PT [] OT [] PMNR    PULM:  RA  sat> 92%  Low suspicion for obstructive shock 2/2 PE 2/2 CTA PE protocol negative on 10/28    CV:  SBP goal: , MAP > 65  S/P 2.5 L of IV fluid administration with good response   F/U ECHO  F/U EKG    RENAL:  Fluids: JC Cr: 0.5-->0.8  s/p 2.5 L of IV fluids  LR 80 ccs/hour    GI:  Diet: Regular diet   GI prophylaxis [] not indicated [] PPI [] other:  Bowel regimen [] colace [] senna [] other:    ENDO:   Goal euglycemia (-180)    HEME/ONC:  VTE prophylaxis: [x] SCDs [] chemoprophylaxis [] high risk of DVT/PE on admission due to:  DVT ppx: Lovenox 40 gm QD  repeat cbc stable, CTA abd and P negative for any acute bleed, low suspicion for hemorrhagic shock    ID: Afebrile   No infectious etiology   Low suspicion for sepsis   F/u LA  F/U procal    MISC:    SOCIAL/FAMILY:  [] updated at bedside [] family meeting    CODE STATUS:  [] Full Code [] DNR [] DNI [] Palliative/Comfort Care    DISPOSITION:  [] ICU [] Stroke Unit [] Floor [] EMU [] RCU [] PCU    [] Patient is at high risk of neurologic deterioration/death due to:     Time seen:  Time spent: ___ [] critical care minutes ASSESSMENT/PLAN: Hypotension s/p fall     NEURO:  NQ4, VSQ4  RCTH post-fall: negative for any trauma/acute pathology  Tylenol PRN for pain  Activity: [] OOB as tolerated [] Bedrest [] PT [] OT [] PMNR    PULM:  RA  sat> 92%  Low suspicion for obstructive shock 2/2 PE 2/2 CTA PE protocol negative on 10/28    CV:  SBP goal: , MAP > 65  S/P 2.5 L of IV fluid administration with good response   F/U ECHO  F/U EKG    RENAL:  Fluids: JC Cr: 0.5-->0.8  s/p 2.5 L of IV fluids  LR 80 ccs/hour    GI:  Diet: Regular diet   GI prophylaxis [] not indicated [] PPI [] other:  Bowel regimen [] colace [] senna [] other:    ENDO:   Goal euglycemia (-180)    HEME/ONC:  VTE prophylaxis: [x] SCDs [] chemoprophylaxis [] high risk of DVT/PE on admission due to:  DVT ppx: Lovenox 40 gm QD  repeat cbc stable, CTA abd and P negative for any acute bleed, low suspicion for hemorrhagic shock    ID: Afebrile   No infectious etiology   Low suspicion for sepsis   F/u LA  F/U procal    MISC:    SOCIAL/FAMILY:  [] updated at bedside [] family meeting    CODE STATUS:  [] Full Code [] DNR [] DNI [] Palliative/Comfort Care    DISPOSITION:  [] ICU [] Stroke Unit [] Floor [] EMU [] RCU [] PCU    [] Patient is at high risk of neurologic deterioration/death due to:     Time seen:  Time spent: ___ [] critical care minutes ASSESSMENT/PLAN: Hypotension s/p fall     NEURO:  NQ4, VSQ4  RCTH post-fall: negative for any trauma/acute pathology  Tylenol and tramadol PRN for pain  change roboxain PRN  Activity: [x] OOB as tolerated [] Bedrest [x] PT [x] OT [] PMNR    PULM:  RA  sat> 92%  Low suspicion for obstructive shock 2/2 PE 2/2 CTA PE protocol negative on 10/28    CV:  SBP goal: , MAP > 65  S/P 2.5 L of IV fluid administration with good response   F/U ECHO  start midodrine 5 q8h  F/U EKG    RENAL:  Fluids: JC Cr: 0.5-->0.8  s/p 2.5 L of IV fluids  LR 80 ccs/hour    GI:  Diet: Regular diet   GI prophylaxis [] not indicated [] PPI [] other:  Bowel regimen [] colace [] senna [] other:    ENDO:   Goal euglycemia (-180)    HEME/ONC:  VTE prophylaxis: [x] SCDs [] chemoprophylaxis [] high risk of DVT/PE on admission due to:  DVT ppx: Lovenox 40 gm QD  repeat cbc stable, CTA abd and P negative for any acute bleed, low suspicion for hemorrhagic shock  multivitamin with Fe    ID: Afebrile   No infectious etiology   Low suspicion for sepsis   F/u LA  F/U procal    MISC:    SOCIAL/FAMILY:  [] updated at bedside [] family meeting    CODE STATUS:  [] Full Code [] DNR [] DNI [] Palliative/Comfort Care    DISPOSITION:  [] ICU [] Stroke Unit [] Floor [] EMU [] RCU [] PCU    [] Patient is at high risk of neurologic deterioration/death due to:     Time seen:  Time spent: ___ [] critical care minutes
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
## 2024-10-29 NOTE — PROGRESS NOTE ADULT - SUBJECTIVE AND OBJECTIVE BOX
Patient was seen at bedside this am. Pt denied any cp, sob, n/v, or abd pain.     OVERNIGHT EVENTS: no acute event overnight    Vital Signs Last 24 Hrs  T(C): 36.4 (29 Oct 2024 04:12), Max: 36.8 (28 Oct 2024 11:56)  T(F): 97.6 (29 Oct 2024 04:12), Max: 98.2 (28 Oct 2024 11:56)  HR: 65 (29 Oct 2024 04:12) (61 - 97)  BP: 118/78 (29 Oct 2024 04:12) (103/64 - 122/75)  BP(mean): --  RR: 18 (29 Oct 2024 04:12) (18 - 18)  SpO2: 99% (29 Oct 2024 04:12) (98% - 99%)    Parameters below as of 29 Oct 2024 04:12  Patient On (Oxygen Delivery Method): room air        I&O's Detail    28 Oct 2024 07:01  -  29 Oct 2024 07:00  --------------------------------------------------------  IN:    Oral Fluid: 960 mL  Total IN: 960 mL    OUT:    Accordian (mL): 55 mL  Total OUT: 55 mL    Total NET: 905 mL        I&O's Summary    28 Oct 2024 07:01  -  29 Oct 2024 07:00  --------------------------------------------------------  IN: 960 mL / OUT: 55 mL / NET: 905 mL        PHYSICAL EXAM:  Neurological:    Motor exam:         [] Upper extremity              Delt        Bicep      Tricep     HG                                               R         5/5        5/5         5/5          5/5                                               L          5/5        5/5         5/5          5/5         [] Lower extremity             HF         KF          KE         PF          DF                                               R        5/5        5/5        5/5       5/5        5/5                                               L         5/5        5/5       5/5        5/5        5/5                                                        [] warm well perfused    Sensation: [] intact to light touch  [] decreased:       Cardiovascular: +s1, s2  Respiratory: clear to auscultation b/l  Gastrointestinal: soft, non-distended, non-tender  Genitourinary:   Extremities:  Incision/Wound:    LABS:                        8.0    11.21 )-----------( 350      ( 29 Oct 2024 07:30 )             24.9     10-29    139  |  103  |  11  ----------------------------<  84  3.5   |  25  |  0.58    Ca    8.7      29 Oct 2024 07:30    TPro  5.9[L]  /  Alb  3.3  /  TBili  0.2  /  DBili  x   /  AST  18  /  ALT  24  /  AlkPhos  65  10-29      Urinalysis Basic - ( 29 Oct 2024 07:30 )    Color: x / Appearance: x / SG: x / pH: x  Gluc: 84 mg/dL / Ketone: x  / Bili: x / Urobili: x   Blood: x / Protein: x / Nitrite: x   Leuk Esterase: x / RBC: x / WBC x   Sq Epi: x / Non Sq Epi: x / Bacteria: x          CAPILLARY BLOOD GLUCOSE          Drug Levels: [] N/A    CSF Analysis: [] N/A      Allergies    penicillins (Rash)    Intolerances      MEDICATIONS:  Antibiotics:    Neuro:  acetaminophen     Tablet .. 650 milliGRAM(s) Oral every 6 hours PRN  gabapentin 400 milliGRAM(s) Oral every 8 hours  methocarbamol 500 milliGRAM(s) Oral every 6 hours  nalbuphine Injectable 2.5 milliGRAM(s) IV Push every 6 hours PRN  ondansetron Injectable 4 milliGRAM(s) IV Push every 6 hours PRN  oxyCODONE    IR 5 milliGRAM(s) Oral every 4 hours PRN  oxyCODONE    IR 10 milliGRAM(s) Oral every 4 hours PRN    Anticoagulation:  enoxaparin Injectable 40 milliGRAM(s) SubCutaneous every 24 hours    OTHER:  bisacodyl 5 milliGRAM(s) Oral daily  calamine/zinc oxide Lotion 1 Application(s) Topical every 6 hours PRN  diphenhydrAMINE Injectable 25 milliGRAM(s) IV Push every 6 hours PRN  influenza   Vaccine 0.5 milliLiter(s) IntraMuscular once  naloxone Injectable 0.1 milliGRAM(s) IV Push every 3 minutes PRN  polyethylene glycol 3350 17 Gram(s) Oral two times a day  senna 2 Tablet(s) Oral at bedtime  sodium chloride 3%  Inhalation 4 milliLiter(s) Inhalation every 12 hours    IVF:  potassium chloride    Tablet ER 20 milliEquivalent(s) Oral every 2 hours    CULTURES:  Culture Results:   No growth at 48 Hours (10-26 @ 05:20)  Culture Results:   No growth at 48 Hours (10-26 @ 05:10)    RADIOLOGY & ADDITIONAL TESTS:       Patient was seen at bedside this am. Pt c/o RLE decrease sensation and pain that is new post op. Pt otherwise denied any cp, sob, n/v, or abd pain.     OVERNIGHT EVENTS: no acute event overnight    Vital Signs Last 24 Hrs  T(C): 36.4 (29 Oct 2024 04:12), Max: 36.8 (28 Oct 2024 11:56)  T(F): 97.6 (29 Oct 2024 04:12), Max: 98.2 (28 Oct 2024 11:56)  HR: 65 (29 Oct 2024 04:12) (61 - 97)  BP: 118/78 (29 Oct 2024 04:12) (103/64 - 122/75)  BP(mean): --  RR: 18 (29 Oct 2024 04:12) (18 - 18)  SpO2: 99% (29 Oct 2024 04:12) (98% - 99%)    Parameters below as of 29 Oct 2024 04:12  Patient On (Oxygen Delivery Method): room air        I&O's Detail    28 Oct 2024 07:01  -  29 Oct 2024 07:00  --------------------------------------------------------  IN:    Oral Fluid: 960 mL  Total IN: 960 mL    OUT:    Accordian (mL): 55 mL  Total OUT: 55 mL    Total NET: 905 mL        I&O's Summary    28 Oct 2024 07:01  -  29 Oct 2024 07:00  --------------------------------------------------------  IN: 960 mL / OUT: 55 mL / NET: 905 mL        PHYSICAL EXAM:  Neurological:  awake, alert, oriented to person, place, and date, PERRL, following commands, RLE 4+/5 pain limited otherwise moving all extremities 5/5, decrease sensation of RLE below the knee to the bottom of right foot  Cardiovascular: +s1, s2  Respiratory: clear to auscultation b/l  Gastrointestinal: soft, non-distended, non-tender  Genitourinary: voiding  Extremities: warm, dry  Incision/Wound: incision site C/D/I, Lt deep HMV- 55ml for past 24 hours    LABS:                        8.0    11.21 )-----------( 350      ( 29 Oct 2024 07:30 )             24.9     10-29    139  |  103  |  11  ----------------------------<  84  3.5   |  25  |  0.58    Ca    8.7      29 Oct 2024 07:30    TPro  5.9[L]  /  Alb  3.3  /  TBili  0.2  /  DBili  x   /  AST  18  /  ALT  24  /  AlkPhos  65  10-29      Urinalysis Basic - ( 29 Oct 2024 07:30 )    Color: x / Appearance: x / SG: x / pH: x  Gluc: 84 mg/dL / Ketone: x  / Bili: x / Urobili: x   Blood: x / Protein: x / Nitrite: x   Leuk Esterase: x / RBC: x / WBC x   Sq Epi: x / Non Sq Epi: x / Bacteria: x          CAPILLARY BLOOD GLUCOSE          Drug Levels: [] N/A    CSF Analysis: [] N/A      Allergies    penicillins (Rash)    Intolerances      MEDICATIONS:  Antibiotics:    Neuro:  acetaminophen     Tablet .. 650 milliGRAM(s) Oral every 6 hours PRN  gabapentin 400 milliGRAM(s) Oral every 8 hours  methocarbamol 500 milliGRAM(s) Oral every 6 hours  nalbuphine Injectable 2.5 milliGRAM(s) IV Push every 6 hours PRN  ondansetron Injectable 4 milliGRAM(s) IV Push every 6 hours PRN  oxyCODONE    IR 5 milliGRAM(s) Oral every 4 hours PRN  oxyCODONE    IR 10 milliGRAM(s) Oral every 4 hours PRN    Anticoagulation:  enoxaparin Injectable 40 milliGRAM(s) SubCutaneous every 24 hours    OTHER:  bisacodyl 5 milliGRAM(s) Oral daily  calamine/zinc oxide Lotion 1 Application(s) Topical every 6 hours PRN  diphenhydrAMINE Injectable 25 milliGRAM(s) IV Push every 6 hours PRN  influenza   Vaccine 0.5 milliLiter(s) IntraMuscular once  naloxone Injectable 0.1 milliGRAM(s) IV Push every 3 minutes PRN  polyethylene glycol 3350 17 Gram(s) Oral two times a day  senna 2 Tablet(s) Oral at bedtime  sodium chloride 3%  Inhalation 4 milliLiter(s) Inhalation every 12 hours    IVF:  potassium chloride    Tablet ER 20 milliEquivalent(s) Oral every 2 hours    CULTURES:  Culture Results:   No growth at 48 Hours (10-26 @ 05:20)  Culture Results:   No growth at 48 Hours (10-26 @ 05:10)    RADIOLOGY & ADDITIONAL TESTS:

## 2024-10-29 NOTE — PROVIDER CONTACT NOTE (FALL NOTIFICATION) - ACTION/TREATMENT ORDERED:
TI Pineda came to bedside to assess. 1L NS 0.9% bolus given as ord. Will re-check BP s/p bolus. b/l knee xray & CT of head ord to r/o traumatic injury. Nursing care ongoing.

## 2024-10-29 NOTE — PROVIDER CONTACT NOTE (MEDICATION) - RECOMMENDATIONS
[facility], [address]    [address]    [facility] [address]    Phone:  [phone]    Fax:  [phone]       Thank You for choosing us for your health care visit. We are glad to serve you and happy to provide you with this summary of your visit. Please help us to ensure we have accurate records. If you find anything that needs to be changed, please let our staff know as soon as possible.          Your Demographic Information     Patient Name Sex Ghassan Hardwick Male 1969       Ethnic Group Patient Race    / Origin White      Your Visit Details     Date & Time Provider Department    3/21/2017 3:50 PM Hilario Doe MD [facility], [address]      Your Upcoming Appointment*(Max 10)     2017 10:30 AM CDT   Post-Op Visit with Hilario Doe MD   [facility], [address] ([facility], [address])    [address]  [facility] [address]   [phone]            2017  8:30 AM CDT   Office Visit with Melanie Archer MD   [place] [address] ([facility])    [address] [address]   [phone]              Your To Do List     Follow-Up    Return if symptoms worsen or fail to improve.      Conditions Discussed Today or Order-Related Diagnoses        Comments    Status post hardware removal    -  Primary       Your Vitals Were     BP Pulse Temp Height Weight BMI    124/78 88 98.6 °F (37 °C) (Oral) 6' 2\" (1.88 m) 318 lb (144.2 kg) 40.83 kg/m2    Smoking Status                   Never Smoker           Medications Prescribed or Re-Ordered Today     None      Your Current Medications Are        Disp Refills Start End    atorvastatin (LIPITOR) 20 MG tablet 90 tablet 2 3/6/2017     Sig - Route: Take 1 tablet by mouth daily. - Oral    Class: Eprescribe    lisinopril (PRINIVIL,ZESTRIL)  notify provider 40 MG tablet 89 tablet 1 3/6/2017     Sig - Route: Take 1 tablet by mouth daily. - Oral    Class: Eprescribe    metFORMIN (GLUCOPHAGE) 500 MG tablet 180 tablet 1 3/6/2017     Sig - Route: Take 1 tablet by mouth 2 times daily (with meals). - Oral    Class: Eprescribe    triamterene-hydrochlorothiazide (MAXZIDE-25) 37.5-25 MG per tablet 89 tablet 1 3/6/2017     Sig - Route: Take 1 tablet by mouth daily. - Oral    Class: Eprescribe    aspirin (ASPIR-81) 81 MG EC tablet 90 tablet 0 3/6/2017     Sig - Route: Take 1 tablet by mouth daily. 0 - Oral    Class: Eprescribe    blood glucose meter 1 kit 0 4/26/2016     Sig: Test blood sugar one time daily as directed. Diagnosis: E11.9. Meter: One touch ultra mini kit (or kit that is covered by insurance)    Class: Eprescribe    blood glucose test strips 100 strip 6 4/26/2016     Sig: Test blood sugar one time daily as directed. Diagnosis: E11.9. One touch ultra(or strips that are covered under insurance)    Class: Eprescribe    ONETOUCH DELICA LANCETS 33G Misc 100 each 6 4/26/2016     Sig: Test blood sugar one time daily. Diagnosis E11.9.    Class: Eprescribe    blood glucose lancets 100 each 11 4/25/2016     Sig: Test blood sugar 1 time daily as directed. Diagnosis: Diabetes.    Class: Eprescribe    Cosign for Ordering: Accepted by Melanie Archer MD on 4/25/2016 12:49 PM      Allergies     No Known Allergies      Problem List as of 3/21/2017     Family history of diabetes mellitus    Ankle stiffness    Status post ORIF of fracture of ankle    Status post hardware removal    Morbid obesity with BMI of 45.0-49.9, adult    Type 2 diabetes mellitus without complication, without long-term current use of insulin            Patient Instructions     None
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
## 2024-10-29 NOTE — CHART NOTE - NSCHARTNOTEFT_GEN_A_CORE
Was called to bedside for unwitnessed fall. When I went into the room, patient was sitting in the toilet with her hands on the rolling walker. Per patient's , he heard her fell in the bathroom. He said patient was kneeling on the floor with her hands on the rolling walker. He picked her up and sat her back to the toilet.   Patient was moved back to bed with the help of PCA and RN. Patient felt dizzy. Patient did not Was called to bedside for unwitnessed fall. When I went into the room, patient was sitting in the toilet with her hands on the rolling walker. Per patient's , he heard her fell in the bathroom. He said patient was kneeling on the floor with her hands on the rolling walker. He picked her up and sat her back to the toilet.   Patient was moved back to bed with the help of PCA and RN. Patient felt dizzy. Patient said she was straining for bowel movement and did not remember if she hit her head.  noticed small area of redness on her right forehead.   Neuro exam right after back to bed: Oriented to person, place, and month/year, face symmetric, following commands, moving all extremities 4/5, stilll has decrease sensation of right lower extremity. no bruise or skin abrasion noted. surgical site C/D/I, drain still attached and holding suction.   repeat SBP improved to 97 after initiating fluid bolus and flat on bed. Dizziness improved after finishing NS bolus. Patient still c/o back pain but no change after fall. Repeat Neuro exam at bedside: Alert, oriented to [place], place, and month/year, face symmetric, following commands, RLE 4+/5 pain limited otherwise moving all extremities 5/5, decrease sensation of right lower extremity.    - CTH showed no acute intracranial hemorrhage, mass effect, or shift of the midline structures.   - xray of b/l knees negative for acute fx or dislocation  - SBP improved after 1L NS bolus, repeat CBC this pm stable, will give another 500ml bolus for soft SBP, check orthostatic vitals prior to getting OOB, fall precautions  - will f/u MRI L spine tonight for RLE dec sensation as planned    Dr. Joyner was made aware of above incident; hospitalist co-management appreciated during this incident Was called to bedside for unwitnessed fall. When I went into the room, patient was sitting in the toilet with her hands on the rolling walker. Per patient's , he heard her fell in the bathroom. He said patient was kneeling on the floor with her hands on the rolling walker. He picked her up and sat her back to the toilet.   Patient was moved back to bed with the help of PCA and RN. Patient felt dizzy. Patient said she was straining for bowel movement and did not remember if she hit her head.  noticed small area of redness on her right forehead.     Neuro exam right after back to bed: Oriented to person, place, and month/year, face symmetric, following commands, moving all extremities 4/5, stilll has decrease sensation of right lower extremity. no bruise or skin abrasion noted. surgical site C/D/I, drain still attached and holding suction.   repeat SBP improved to 97 after initiating fluid bolus and flat on bed. Dizziness improved after finishing NS bolus. Patient still c/o back pain but no change after fall. Repeat Neuro exam at bedside: Alert, oriented to [place], place, and month/year, face symmetric, following commands, RLE 4+/5 pain limited otherwise moving all extremities 5/5, decrease sensation of right lower extremity.    CTH post fall showed no acute intracranial hemorrhage, mass effect, or shift of the midline structures and xray of b/l knees negative for acute fx or dislocation. Repeat CBC this pm stable and patient received another 500ml bolus for soft SBP.     Patient became hypotensive to 80s with tachycardia at around 6pm. Patient was seen at bedside. Patient still oriented to person, place, and month/year, but appears more lethargic, face symmetric, fc, b/l UEs 5/5, RLE pain limited 4/5, LLE 4+/5, Patient noted to have abd pain with RLQ tenderness that is new. 1L bolus was initiated. MR L spine cancelled due to acute hypotension. NSCU consult appreciated. Serum lactate sent, result pending. CT Abd/pelvis done after SBP improved to 90s with NS bolus, prelim no obvious RP bleed. Pt was transferred to [facility] for close monitoring. SBP upon arrival at NSCU 106. Patient was signed out to NSCU provider upon arrival.     Dr. Joyner was made aware of above incident. Patient's  was updated at bedside regarding above. All questions and concerns were addressed.

## 2024-10-29 NOTE — PROVIDER CONTACT NOTE (OTHER) - REASON
no BM since 10/24
pt hypotensive
c/o feeling lightheaded
pt hypotensive BP 80/54
Pt complaining of SOB

## 2024-10-29 NOTE — PROGRESS NOTE ADULT - PROBLEM SELECTOR PLAN 3
S/p surgery as above. CT L-spine demonstrates post-op changes with acceptable implant placement and spinal alignment.     Reports increased RLE numbness- MRI pending.

## 2024-10-29 NOTE — PROVIDER CONTACT NOTE (OTHER) - ACTION/TREATMENT ORDERED:
PA Anselmo Pineda aware, STAT CBC & BMP ord & sent. Type & Screen ord & sent as well. 500mL 0.9% NS bolus given as ord. Nursing care ongoing.
500cc bolus of NS
EKG, BMP and trops ordered.
TI Pineda aware, mag citrate & dulcolax suppository ordered. Nursing care ongoing.
Anselmo Briones PA agreeable to come to bedside to assess pt. Labs drawn w/ PA present. 1L IVF bolus given. Pt placed trendelenburg and hypotensive improved to 95/54. Pt tx to CT w/ PA, ICU MD and RN present.
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
## 2024-10-29 NOTE — PROVIDER CONTACT NOTE (OTHER) - SITUATION
pt hypotensive
pt hypotensive BP 80/54
Pt complaining of SOB
no BM since 10/24
pt c/o feeling lightheaded

## 2024-10-29 NOTE — PROGRESS NOTE ADULT - SUBJECTIVE AND OBJECTIVE BOX
[facility], Division of Infectious   Dr West, Dr Red, Dr Ayala, KORI Jimenez Jake  [phone]  after hours and weekends [phone]    Name: SILVINA KUMAR  Age: 34y  Gender: Female  MRN: [id number]    Interval History--  Notes reviewed  pain rle       Allergies    penicillins (Rash)    Intolerances        Medications--  Antibiotics:    Immunologic:  influenza   Vaccine 0.5 milliLiter(s) IntraMuscular once    Other:  acetaminophen     Tablet .. PRN  calamine/zinc oxide Lotion PRN  diphenhydrAMINE Injectable PRN  enoxaparin Injectable  gabapentin  methocarbamol  nalbuphine Injectable PRN  naloxone Injectable PRN  ondansetron Injectable PRN  oxyCODONE    IR PRN  oxyCODONE    IR PRN  polyethylene glycol 3350  senna  sodium chloride 0.9% Bolus  sodium chloride 3%  Inhalation      Review of Systems--  A 10-point review of systems was obtained.     Pertinent positives and negatives--  Constitutional: No fevers. No Chills. No Rigors.   Cardiovascular: No chest pain. No palpitations.  Respiratory: No shortness of breath. No cough.  Gastrointestinal: No nausea or vomiting. No diarrhea or constipation.   Psychiatric: Pleasant. Appropriate affect.    Review of systems otherwise negative except as previously noted.    Physical Examination--  Vital Signs: T(F): 97.9 (10-29-24 @ 15:00), Max: 98.3 (10-29-24 @ 08:56)  HR: 72 (10-29-24 @ 15:00)  BP: 94/58 (10-29-24 @ 15:00)  RR: 16 (10-29-24 @ 15:00)  SpO2: 100% (10-29-24 @ 15:00)  Wt(kg): --  General: Nontoxic-appearing Female in no acute distress.  HEENT: AT/NC. P  Neck: Not rigid. No sense of mass.  Nodes: None palpable.  Lungs: Clear bilaterally without rales, wheezing or rhonchi  Heart: Regular rate and rhythm.  Abdomen: Bowel sounds present and normoactive. Soft. Nondistended. Nontender.   Back: No spinal tenderness. No costovertebral angle tenderness.   Extremities: No cyanosis or clubbing. No edema.   Skin: Warm. Dry. Good turgor. No rash. No vasculitic stigmata.  Psychiatric: Appropriate affect and mood for situation.         Laboratory Studies--  CBC                        8.0    11.21 )-----------( 350      ( 29 Oct 2024 07:30 )             24.9       Chemistries  10-29    139  |  103  |  11  ----------------------------<  84  3.5   |  25  |  0.58    Ca    8.7      29 Oct 2024 07:30    TPro  5.9[L]  /  Alb  3.3  /  TBili  0.2  /  DBili  x   /  AST  18  /  ALT  24  /  AlkPhos  65  10-29      Culture Data    Culture - Blood (collected 26 Oct 2024 05:20)  Source: .Blood BLOOD  Preliminary Report (29 Oct 2024 11:01):    No growth at 72 Hours    Culture - Blood (collected 26 Oct 2024 05:10)  Source: .Blood BLOOD  Preliminary Report (29 Oct 2024 11:01):    No growth at 72 Hours    < from: CT Lumbar Spine No Cont (10.28.24 @ 15:57) >        INTERPRETATION:  .    CLINICAL INFORMATION: Increased right lower cavity limits and   tingling/pain status post lumbar fusion.    TECHNIQUE: Transaxial CT images of the lumbar spine was obtained without   IV contrast. Axial, sagittal and coronal reformatted images were also   reviewed.    COMPARISON: Prior CT study of the lumbar spine from 9/30/2024.    FINDINGS: The patient is status post interval L5-S1 posterior fusion and   laminectomy as well as placement of an interbody fusion graft.   Facetectomy post surgical changes are also seen. Adjacent bone graft   material is in place. A dorsal surgical drain is also in place. The   orthopedic hardware generates streak and beam hardening artifact,   limiting evaluation of the adjacent structures. The orthopedic hardware   appears intact. The transpedicular screws do not encroach upon the spinal   canal or neural foramina.    The lumbosacral alignment is maintained. There is no loss of vertebral   body height. No aggressive osteoblastic or osteolytic lesions are seen.   Disc spaces are maintained with the exception of the L5-S1 level with   there is an interbody fusion graft.    Evaluation of the intraspinal contents is limited on CT modality it also   secondary to streak and beam hardening artifact generated by the   orthopedic hardware.    Right upper quadrant abdominal surgical clips are seen within the   gallbladder fossa.Surgical clips are seen within the bilateral adnexal   regions.    IMPRESSION: Interval L5-S1 posterior fusion, laminectomy, and interbody   fusion graft placement with expected post surgical changes when compared   with 9/30/2024.    < end of copied text >  < from: CT Angio Chest PE Protocol w/ IV Cont (10.28.24 @ 15:57) >    FINDINGS:    AIRWAYS/LUNGS/PLEURA: Right lower lobe tree in bud opacities (6:263).   Bibasilar atelectasis. No pleural effusion.    MEDIASTINUMAND MARGARITA: No lymphadenopathy.    PULMONARY ANGIOGRAM: The study is technically adequate with a good   contrast bolus to the pulmonary arteries. No central, lobar, or segmental   pulmonary embolism. Suboptimal assessment of the subsegmental branches   due to poor contrast opacification.    VESSELS: Within normal limits.    HEART: Heart size is normal. No pericardial effusion.    VISUALIZED UPPER ABDOMEN: Status post sleeve gastrectomy and   cholecystectomy.    CHEST WALL AND BONES: There is 8mm soft tissue nodule in the left breast   (series 5, image 63). Lumbar fusion hardware is visualized on    imaging.    IMPRESSION:    No pulmonary embolism.    Right lower lobe tree in bud opacities likely represented mucoid impacted   distal airways.    Indeterminate 8mm soft tissue nodule in the left breast, for which   correlation with dedicated breast imaging is suggested.    < end of copied text >

## 2024-10-30 LAB
ANION GAP SERPL CALC-SCNC: 8 MMOL/L — SIGNIFICANT CHANGE UP (ref 5–17)
APPEARANCE UR: CLEAR — SIGNIFICANT CHANGE UP
BILIRUB UR-MCNC: NEGATIVE — SIGNIFICANT CHANGE UP
BUN SERPL-MCNC: 7 MG/DL — SIGNIFICANT CHANGE UP (ref 7–23)
CALCIUM SERPL-MCNC: 7.9 MG/DL — LOW (ref 8.4–10.5)
CHLORIDE SERPL-SCNC: 106 MMOL/L — SIGNIFICANT CHANGE UP (ref 96–108)
CO2 SERPL-SCNC: 22 MMOL/L — SIGNIFICANT CHANGE UP (ref 22–31)
COLOR SPEC: YELLOW — SIGNIFICANT CHANGE UP
CREAT SERPL-MCNC: 0.54 MG/DL — SIGNIFICANT CHANGE UP (ref 0.5–1.3)
DIFF PNL FLD: NEGATIVE — SIGNIFICANT CHANGE UP
EGFR: 123 ML/MIN/1.73M2 — SIGNIFICANT CHANGE UP
GLUCOSE SERPL-MCNC: 90 MG/DL — SIGNIFICANT CHANGE UP (ref 70–99)
GLUCOSE UR QL: NEGATIVE MG/DL — SIGNIFICANT CHANGE UP
HCT VFR BLD CALC: 26 % — LOW (ref 34.5–45)
HGB BLD-MCNC: 8.2 G/DL — LOW (ref 11.5–15.5)
KETONES UR-MCNC: NEGATIVE MG/DL — SIGNIFICANT CHANGE UP
LEUKOCYTE ESTERASE UR-ACNC: NEGATIVE — SIGNIFICANT CHANGE UP
MAGNESIUM SERPL-MCNC: 1.9 MG/DL — SIGNIFICANT CHANGE UP (ref 1.6–2.6)
MCHC RBC-ENTMCNC: 26.3 PG — LOW (ref 27–34)
MCHC RBC-ENTMCNC: 31.5 G/DL — LOW (ref 32–36)
MCV RBC AUTO: 83.3 FL — SIGNIFICANT CHANGE UP (ref 80–100)
NITRITE UR-MCNC: NEGATIVE — SIGNIFICANT CHANGE UP
NRBC # BLD: 0 /100 WBCS — SIGNIFICANT CHANGE UP (ref 0–0)
PH UR: 7.5 — SIGNIFICANT CHANGE UP (ref 5–8)
PHOSPHATE SERPL-MCNC: 4.1 MG/DL — SIGNIFICANT CHANGE UP (ref 2.5–4.5)
PLATELET # BLD AUTO: 317 K/UL — SIGNIFICANT CHANGE UP (ref 150–400)
POTASSIUM SERPL-MCNC: 4.3 MMOL/L — SIGNIFICANT CHANGE UP (ref 3.5–5.3)
POTASSIUM SERPL-SCNC: 4.3 MMOL/L — SIGNIFICANT CHANGE UP (ref 3.5–5.3)
PROT UR-MCNC: NEGATIVE MG/DL — SIGNIFICANT CHANGE UP
RBC # BLD: 3.12 M/UL — LOW (ref 3.8–5.2)
RBC # FLD: 17.3 % — HIGH (ref 10.3–14.5)
SODIUM SERPL-SCNC: 136 MMOL/L — SIGNIFICANT CHANGE UP (ref 135–145)
SP GR SPEC: 1.02 — SIGNIFICANT CHANGE UP (ref 1–1.03)
UROBILINOGEN FLD QL: 1 MG/DL — SIGNIFICANT CHANGE UP (ref 0.2–1)
WBC # BLD: 7.19 K/UL — SIGNIFICANT CHANGE UP (ref 3.8–10.5)
WBC # FLD AUTO: 7.19 K/UL — SIGNIFICANT CHANGE UP (ref 3.8–10.5)

## 2024-10-30 PROCEDURE — 72158 MRI LUMBAR SPINE W/O & W/DYE: CPT | Mod: 26

## 2024-10-30 PROCEDURE — 71045 X-RAY EXAM CHEST 1 VIEW: CPT | Mod: 26

## 2024-10-30 PROCEDURE — 99232 SBSQ HOSP IP/OBS MODERATE 35: CPT

## 2024-10-30 RX ORDER — MIDODRINE HYDROCHLORIDE 2.5 MG/1
5 TABLET ORAL EVERY 8 HOURS
Refills: 0 | Status: DISCONTINUED | OUTPATIENT
Start: 2024-10-30 | End: 2024-11-01

## 2024-10-30 RX ORDER — CHOLESTEROL/SOYBEAN OIL/C/E 60-200-80
15 POWDER (GRAM) ORAL DAILY
Refills: 0 | Status: DISCONTINUED | OUTPATIENT
Start: 2024-10-30 | End: 2024-11-01

## 2024-10-30 RX ORDER — ACETAMINOPHEN 500 MG
1000 TABLET ORAL ONCE
Refills: 0 | Status: COMPLETED | OUTPATIENT
Start: 2024-10-30 | End: 2024-10-30

## 2024-10-30 RX ORDER — TRAMADOL HYDROCHLORIDE 50 MG/1
25 TABLET, COATED ORAL ONCE
Refills: 0 | Status: DISCONTINUED | OUTPATIENT
Start: 2024-10-30 | End: 2024-10-30

## 2024-10-30 RX ORDER — SODIUM CHLORIDE 9 MG/ML
1000 INJECTION, SOLUTION INTRAMUSCULAR; INTRAVENOUS; SUBCUTANEOUS ONCE
Refills: 0 | Status: COMPLETED | OUTPATIENT
Start: 2024-10-30 | End: 2024-10-30

## 2024-10-30 RX ORDER — TRAMADOL HYDROCHLORIDE 50 MG/1
25 TABLET, COATED ORAL EVERY 4 HOURS
Refills: 0 | Status: DISCONTINUED | OUTPATIENT
Start: 2024-10-30 | End: 2024-10-30

## 2024-10-30 RX ORDER — MAGNESIUM SULFATE IN 0.9% NACL 2 G/50 ML
1 INTRAVENOUS SOLUTION, PIGGYBACK (ML) INTRAVENOUS ONCE
Refills: 0 | Status: COMPLETED | OUTPATIENT
Start: 2024-10-30 | End: 2024-10-30

## 2024-10-30 RX ORDER — TRAMADOL HYDROCHLORIDE 50 MG/1
50 TABLET, COATED ORAL EVERY 4 HOURS
Refills: 0 | Status: DISCONTINUED | OUTPATIENT
Start: 2024-10-30 | End: 2024-11-01

## 2024-10-30 RX ORDER — DIAZEPAM 10 MG/1
2 FILM BUCCAL ONCE
Refills: 0 | Status: DISCONTINUED | OUTPATIENT
Start: 2024-10-30 | End: 2024-10-30

## 2024-10-30 RX ORDER — METHOCARBAMOL 500 MG/1
500 TABLET ORAL EVERY 6 HOURS
Refills: 0 | Status: DISCONTINUED | OUTPATIENT
Start: 2024-10-30 | End: 2024-11-01

## 2024-10-30 RX ORDER — DIPHENHYDRAMINE HCL 12.5MG/5ML
25 ELIXIR ORAL EVERY 4 HOURS
Refills: 0 | Status: DISCONTINUED | OUTPATIENT
Start: 2024-10-30 | End: 2024-11-01

## 2024-10-30 RX ADMIN — METHOCARBAMOL 500 MILLIGRAM(S): 500 TABLET ORAL at 05:17

## 2024-10-30 RX ADMIN — GABAPENTIN 400 MILLIGRAM(S): 300 CAPSULE ORAL at 15:56

## 2024-10-30 RX ADMIN — Medication 400 MILLIGRAM(S): at 14:18

## 2024-10-30 RX ADMIN — Medication 650 MILLIGRAM(S): at 22:15

## 2024-10-30 RX ADMIN — Medication 100 GRAM(S): at 06:32

## 2024-10-30 RX ADMIN — TRAMADOL HYDROCHLORIDE 25 MILLIGRAM(S): 50 TABLET, COATED ORAL at 10:40

## 2024-10-30 RX ADMIN — Medication 650 MILLIGRAM(S): at 04:30

## 2024-10-30 RX ADMIN — MIDODRINE HYDROCHLORIDE 5 MILLIGRAM(S): 2.5 TABLET ORAL at 09:38

## 2024-10-30 RX ADMIN — POLYETHYLENE GLYCOL 3350 17 GRAM(S): 17 POWDER, FOR SOLUTION ORAL at 05:41

## 2024-10-30 RX ADMIN — TRAMADOL HYDROCHLORIDE 25 MILLIGRAM(S): 50 TABLET, COATED ORAL at 11:10

## 2024-10-30 RX ADMIN — DIAZEPAM 2 MILLIGRAM(S): 10 FILM BUCCAL at 12:28

## 2024-10-30 RX ADMIN — Medication 650 MILLIGRAM(S): at 05:00

## 2024-10-30 RX ADMIN — TRAMADOL HYDROCHLORIDE 25 MILLIGRAM(S): 50 TABLET, COATED ORAL at 09:00

## 2024-10-30 RX ADMIN — Medication 650 MILLIGRAM(S): at 21:15

## 2024-10-30 RX ADMIN — MIDODRINE HYDROCHLORIDE 5 MILLIGRAM(S): 2.5 TABLET ORAL at 21:14

## 2024-10-30 RX ADMIN — POLYETHYLENE GLYCOL 3350 17 GRAM(S): 17 POWDER, FOR SOLUTION ORAL at 17:20

## 2024-10-30 RX ADMIN — TRAMADOL HYDROCHLORIDE 50 MILLIGRAM(S): 50 TABLET, COATED ORAL at 21:15

## 2024-10-30 RX ADMIN — TRAMADOL HYDROCHLORIDE 25 MILLIGRAM(S): 50 TABLET, COATED ORAL at 09:30

## 2024-10-30 RX ADMIN — GABAPENTIN 400 MILLIGRAM(S): 300 CAPSULE ORAL at 05:17

## 2024-10-30 RX ADMIN — Medication 15 MILLILITER(S): at 11:06

## 2024-10-30 RX ADMIN — Medication 40 MILLIGRAM(S): at 21:14

## 2024-10-30 RX ADMIN — Medication 2 TABLET(S): at 21:14

## 2024-10-30 RX ADMIN — MIDODRINE HYDROCHLORIDE 5 MILLIGRAM(S): 2.5 TABLET ORAL at 15:56

## 2024-10-30 RX ADMIN — SODIUM CHLORIDE 1000 MILLILITER(S): 9 INJECTION, SOLUTION INTRAMUSCULAR; INTRAVENOUS; SUBCUTANEOUS at 14:00

## 2024-10-30 RX ADMIN — GABAPENTIN 400 MILLIGRAM(S): 300 CAPSULE ORAL at 21:14

## 2024-10-30 RX ADMIN — Medication 25 MILLIGRAM(S): at 21:14

## 2024-10-30 RX ADMIN — METHOCARBAMOL 500 MILLIGRAM(S): 500 TABLET ORAL at 09:38

## 2024-10-30 RX ADMIN — TRAMADOL HYDROCHLORIDE 50 MILLIGRAM(S): 50 TABLET, COATED ORAL at 20:15

## 2024-10-30 NOTE — PROGRESS NOTE ADULT - ASSESSMENT
ASSESSMENT/PLAN: Hypotension s/p fall     NEURO:  NQ4, VSQ4  RCTH post-fall: negative for any trauma/acute pathology  Tylenol PRN for pain  Activity: [] OOB as tolerated [] Bedrest [] PT [] OT [] PMNR    PULM:  RA  sat> 92%  Low suspicion for obstructive shock 2/2 PE 2/2 CTA PE protocol negative on 10/28    CV:  SBP goal: , MAP > 65  S/P 2.5 L of IV fluid administration with good response   F/U ECHO  F/U EKG    RENAL:  Fluids: JC Cr: 0.5-->0.8  s/p 2.5 L of IV fluids  LR 80 ccs/hour    GI:  Diet: Regular diet   GI prophylaxis [] not indicated [] PPI [] other:  Bowel regimen [] colace [] senna [] other:    ENDO:   Goal euglycemia (-180)    HEME/ONC:  VTE prophylaxis: [x] SCDs [] chemoprophylaxis [] high risk of DVT/PE on admission due to:  DVT ppx: Lovenox 40 gm QD  repeat cbc stable, CTA abd and P negative for any acute bleed, low suspicion for hemorrhagic shock    ID: Afebrile   No infectious etiology   Low suspicion for sepsis   F/u LA  F/U procal    MISC:    SOCIAL/FAMILY:  [] updated at bedside [] family meeting    CODE STATUS:  [] Full Code [] DNR [] DNI [] Palliative/Comfort Care    DISPOSITION:  [] ICU [] Stroke Unit [] Floor [] EMU [] RCU [] PCU    [] Patient is at high risk of neurologic deterioration/death due to:     Time seen:  Time spent: ___ [] critical care minutes
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
## 2024-10-30 NOTE — PROGRESS NOTE ADULT - ATTENDING COMMENTS
33yo woman   L5-S1 lanimectomy POD 4  hypotensive refractory to fluid boluses, transferred to ICU, recovered   Q4 checks  +back pain, anxiety  vitals reviewed  labs reviewed; hypomagnesemia repleted  Q4 checks  robaxin prn, tylenol/tramadol prn  midodrine 5mg Q8  SBP   MVI w/ iron supp  lovenox ppx  PT/OT OOB as tolerated  maintain euglycemia  monitor for fevers  imaging per neurosurgery  stable for transfer to floor   not critically ill 40min spent

## 2024-10-30 NOTE — PROGRESS NOTE ADULT - TIME BILLING
Patient just transferred out of [facility], on way to [address] [place] [address].  Continued R anterior shin numbness.  VSS  Continue PT and Hemovacs.

## 2024-10-30 NOTE — PROGRESS NOTE ADULT - SUBJECTIVE AND OBJECTIVE BOX
Patient seen and examined at bedside.    --Anticoagulation--  enoxaparin Injectable 40 milliGRAM(s) SubCutaneous every 24 hours    T(C): 36.6 (10-29-24 @ 23:00), Max: 37 (10-29-24 @ 19:00)  HR: 68 (10-30-24 @ 00:00) (61 - 100)  BP: 87/58 (10-30-24 @ 00:00) (80/54 - 118/78)  RR: 16 (10-30-24 @ 00:00) (16 - 22)  SpO2: 100% (10-30-24 @ 00:00) (95% - 100%)  Wt(kg): --    Exam: awake KIM strong, DF/PF 5/5. Very anxious w L>R radic and LBP preop

## 2024-10-31 LAB
ANION GAP SERPL CALC-SCNC: 11 MMOL/L — SIGNIFICANT CHANGE UP (ref 5–17)
BUN SERPL-MCNC: 11 MG/DL — SIGNIFICANT CHANGE UP (ref 7–23)
CALCIUM SERPL-MCNC: 8.4 MG/DL — SIGNIFICANT CHANGE UP (ref 8.4–10.5)
CHLORIDE SERPL-SCNC: 99 MMOL/L — SIGNIFICANT CHANGE UP (ref 96–108)
CO2 SERPL-SCNC: 22 MMOL/L — SIGNIFICANT CHANGE UP (ref 22–31)
CREAT SERPL-MCNC: 0.67 MG/DL — SIGNIFICANT CHANGE UP (ref 0.5–1.3)
CULTURE RESULTS: SIGNIFICANT CHANGE UP
CULTURE RESULTS: SIGNIFICANT CHANGE UP
EGFR: 117 ML/MIN/1.73M2 — SIGNIFICANT CHANGE UP
GLUCOSE SERPL-MCNC: 137 MG/DL — HIGH (ref 70–99)
HCT VFR BLD CALC: 28.1 % — LOW (ref 34.5–45)
HGB BLD-MCNC: 8.7 G/DL — LOW (ref 11.5–15.5)
MAGNESIUM SERPL-MCNC: 1.9 MG/DL — SIGNIFICANT CHANGE UP (ref 1.6–2.6)
MCHC RBC-ENTMCNC: 25.8 PG — LOW (ref 27–34)
MCHC RBC-ENTMCNC: 31 G/DL — LOW (ref 32–36)
MCV RBC AUTO: 83.4 FL — SIGNIFICANT CHANGE UP (ref 80–100)
NRBC # BLD: 0 /100 WBCS — SIGNIFICANT CHANGE UP (ref 0–0)
OSMOLALITY SERPL: 283 MOSMOL/KG — SIGNIFICANT CHANGE UP (ref 275–300)
OSMOLALITY UR: 82 MOS/KG — LOW (ref 300–900)
PHOSPHATE SERPL-MCNC: 3.5 MG/DL — SIGNIFICANT CHANGE UP (ref 2.5–4.5)
PLATELET # BLD AUTO: 366 K/UL — SIGNIFICANT CHANGE UP (ref 150–400)
POTASSIUM SERPL-MCNC: 3.5 MMOL/L — SIGNIFICANT CHANGE UP (ref 3.5–5.3)
POTASSIUM SERPL-SCNC: 3.5 MMOL/L — SIGNIFICANT CHANGE UP (ref 3.5–5.3)
RBC # BLD: 3.37 M/UL — LOW (ref 3.8–5.2)
RBC # FLD: 17.2 % — HIGH (ref 10.3–14.5)
SODIUM SERPL-SCNC: 132 MMOL/L — LOW (ref 135–145)
SODIUM UR-SCNC: 9 MMOL/L — SIGNIFICANT CHANGE UP
SPECIMEN SOURCE: SIGNIFICANT CHANGE UP
SPECIMEN SOURCE: SIGNIFICANT CHANGE UP
WBC # BLD: 8.57 K/UL — SIGNIFICANT CHANGE UP (ref 3.8–10.5)
WBC # FLD AUTO: 8.57 K/UL — SIGNIFICANT CHANGE UP (ref 3.8–10.5)

## 2024-10-31 PROCEDURE — 99233 SBSQ HOSP IP/OBS HIGH 50: CPT

## 2024-10-31 RX ORDER — SODIUM CHLORIDE 9 MG/ML
500 INJECTION, SOLUTION INTRAMUSCULAR; INTRAVENOUS; SUBCUTANEOUS ONCE
Refills: 0 | Status: COMPLETED | OUTPATIENT
Start: 2024-10-31 | End: 2024-10-31

## 2024-10-31 RX ORDER — LIDOCAINE HYDROCHLORIDE 40 MG/ML
5 SOLUTION TOPICAL EVERY 6 HOURS
Refills: 0 | Status: DISCONTINUED | OUTPATIENT
Start: 2024-10-31 | End: 2024-11-01

## 2024-10-31 RX ORDER — POTASSIUM CHLORIDE 10 MEQ
40 TABLET, EXTENDED RELEASE ORAL EVERY 4 HOURS
Refills: 0 | Status: COMPLETED | OUTPATIENT
Start: 2024-10-31 | End: 2024-10-31

## 2024-10-31 RX ORDER — MAGNESIUM GLUCONATE 27.5 (500)
500 TABLET ORAL ONCE
Refills: 0 | Status: COMPLETED | OUTPATIENT
Start: 2024-10-31 | End: 2024-10-31

## 2024-10-31 RX ORDER — DIPHENHYDRAMINE HYDROCHLORIDE AND LIDOCAINE HYDROCHLORIDE AND ALUMINUM HYDROXIDE AND MAGNESIUM HYDRO
5 KIT EVERY 6 HOURS
Refills: 0 | Status: DISCONTINUED | OUTPATIENT
Start: 2024-10-31 | End: 2024-11-01

## 2024-10-31 RX ADMIN — POLYETHYLENE GLYCOL 3350 17 GRAM(S): 17 POWDER, FOR SOLUTION ORAL at 17:43

## 2024-10-31 RX ADMIN — Medication 2 TABLET(S): at 21:28

## 2024-10-31 RX ADMIN — POLYETHYLENE GLYCOL 3350 17 GRAM(S): 17 POWDER, FOR SOLUTION ORAL at 05:49

## 2024-10-31 RX ADMIN — Medication 25 MILLIGRAM(S): at 21:28

## 2024-10-31 RX ADMIN — Medication 15 MILLILITER(S): at 13:15

## 2024-10-31 RX ADMIN — GABAPENTIN 400 MILLIGRAM(S): 300 CAPSULE ORAL at 13:13

## 2024-10-31 RX ADMIN — Medication 500 MILLIGRAM(S): at 17:33

## 2024-10-31 RX ADMIN — GABAPENTIN 400 MILLIGRAM(S): 300 CAPSULE ORAL at 21:29

## 2024-10-31 RX ADMIN — Medication 40 MILLIEQUIVALENT(S): at 13:13

## 2024-10-31 RX ADMIN — MIDODRINE HYDROCHLORIDE 5 MILLIGRAM(S): 2.5 TABLET ORAL at 13:13

## 2024-10-31 RX ADMIN — TRAMADOL HYDROCHLORIDE 50 MILLIGRAM(S): 50 TABLET, COATED ORAL at 17:28

## 2024-10-31 RX ADMIN — Medication 650 MILLIGRAM(S): at 04:02

## 2024-10-31 RX ADMIN — TRAMADOL HYDROCHLORIDE 50 MILLIGRAM(S): 50 TABLET, COATED ORAL at 21:28

## 2024-10-31 RX ADMIN — LIDOCAINE HYDROCHLORIDE 5 MILLILITER(S): 40 SOLUTION TOPICAL at 14:18

## 2024-10-31 RX ADMIN — GABAPENTIN 400 MILLIGRAM(S): 300 CAPSULE ORAL at 05:49

## 2024-10-31 RX ADMIN — MIDODRINE HYDROCHLORIDE 5 MILLIGRAM(S): 2.5 TABLET ORAL at 21:29

## 2024-10-31 RX ADMIN — Medication 40 MILLIGRAM(S): at 21:29

## 2024-10-31 RX ADMIN — SODIUM CHLORIDE 500 MILLILITER(S): 9 INJECTION, SOLUTION INTRAMUSCULAR; INTRAVENOUS; SUBCUTANEOUS at 13:16

## 2024-10-31 RX ADMIN — Medication 650 MILLIGRAM(S): at 05:00

## 2024-10-31 RX ADMIN — Medication 40 MILLIEQUIVALENT(S): at 17:28

## 2024-10-31 RX ADMIN — MIDODRINE HYDROCHLORIDE 5 MILLIGRAM(S): 2.5 TABLET ORAL at 05:48
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
## 2024-10-31 NOTE — PROGRESS NOTE ADULT - SUBJECTIVE AND OBJECTIVE BOX
[facility], Division of Infectious Diseases  JESSICA Mcmullen Y. Patel, S. Shah, G. [facility] [place]  [phone]    Name: SILVINA KUMAR  Age: 34y  Gender: Female  MRN: [id number]    Interval History:  In the interim [facility] PM and txferred back to the floors  Febrile yesterday afternoon Tm 101.1, 101.3F. Repeat w/u sent. Currently off Abx  No acute overnight events, pt w/o complaints  Notes reviewed    Antibiotics:      Medications:  acetaminophen     Tablet .. 650 milliGRAM(s) Oral every 6 hours PRN  calamine/zinc oxide Lotion 1 Application(s) Topical every 6 hours PRN  diphenhydrAMINE 25 milliGRAM(s) Oral every 4 hours PRN  enoxaparin Injectable 40 milliGRAM(s) SubCutaneous every 24 hours  gabapentin 400 milliGRAM(s) Oral every 8 hours  magnesium gluconate 500 milliGRAM(s) Oral once  methocarbamol 500 milliGRAM(s) Oral every 6 hours PRN  midodrine. 5 milliGRAM(s) Oral every 8 hours  multivitamin/minerals/iron Oral Solution (CENTRUM) 15 milliLiter(s) Oral daily  polyethylene glycol 3350 17 Gram(s) Oral two times a day  potassium chloride    Tablet ER 40 milliEquivalent(s) Oral every 4 hours  senna 2 Tablet(s) Oral at bedtime  traMADol 50 milliGRAM(s) Oral every 4 hours PRN      Review of Systems:  A 10-point review of systems was obtained.   Review of systems otherwise negative except as previously noted.    Allergies: penicillins (Rash)    For details regarding the patient's past medical history, social history, family history, and other miscellaneous elements, please refer the initial infectious diseases consultation and/or the admitting history and physical examination for this admission.    Objective:  Vitals:   T(C): 36.8 (10-31-24 @ 08:45), Max: 38.5 (10-30-24 @ 15:00)  HR: 890 (10-31-24 @ 08:45) (67 - 890)  BP: 104/71 (10-31-24 @ 08:45) (90/60 - 105/70)  RR: 18 (10-31-24 @ 08:45) (18 - 26)  SpO2: 97% (10-31-24 @ 08:45) (97% - 99%)    Physical Examination:  General: no acute distress  HEENT: NC/AT, EOMI,  Cardio: S1, S2 heard, RRR, no murmurs  Resp: breath sounds heard bilaterally, no rales, wheezes or rhonchi  Abd: soft, NT, ND  Ext: no edema or cyanosis  Skin: warm, dry, no visible rash      Laboratory Studies:  CBC:                       8.7    8.57  )-----------( 366      ( 31 Oct 2024 07:12 )             28.1     CMP: 10-31    132[L]  |  99  |  11  ----------------------------<  137[H]  3.5   |  22  |  0.67    Ca    8.4      31 Oct 2024 07:02  Phos  3.5     10-31  Mg     1.9     10-31        Urinalysis Basic - ( 31 Oct 2024 07:02 )    Color: x / Appearance: x / SG: x / pH: x  Gluc: 137 mg/dL / Ketone: x  / Bili: x / Urobili: x   Blood: x / Protein: x / Nitrite: x   Leuk Esterase: x / RBC: x / WBC x   Sq Epi: x / Non Sq Epi: x / Bacteria: x        Microbiology: reviewed    Culture - Blood (collected 10-26-24 @ 05:20)  Source: .Blood BLOOD  Final Report (10-31-24 @ 11:01):    No growth at 5 days    Culture - Blood (collected 10-26-24 @ 05:10)  Source: .Blood BLOOD  Final Report (10-31-24 @ 11:01):    No growth at 5 days          Radiology: reviewed      < from: Xray Chest 1 View- PORTABLE-Urgent (Xray Chest 1 View- PORTABLE-Urgent .) (10.30.24 @ 15:55) >    ACC: [id number] EXAM:  XR CHEST PORTABLE URGENT 1V   ORDERED BY:  ZIA CASANOVA     PROCEDURE DATE:  10/30/2024          INTERPRETATION:  HISTORY: Fever    TECHNIQUE: A single AP view of the chest was obtained.    COMPARISON: 10/26/2024    FINDINGS:  The cardiac silhouette is normal in size. There are no focal   consolidations or pleural effusions. The hilar and mediastinal structures   appear unremarkable. The osseous structures are intact.    IMPRESSION: Clear lungs..    --- End of Report ---            ROSENDO GUTIERREZ MD; Attending Radiologist  This document has been electronically signed. Oct 30 2024  4:15PM    < end of copied text >  < from: MR Lumbar Spine w/wo IV Cont (10.30.24 @ 13:17) >    ACC: [id number] EXAM:  MR SPINE LUMBAR WAW IC   ORDERED BY: SAVI RDO     PROCEDURE DATE:  10/30/2024          INTERPRETATION:  CLINICAL INFORMATION: L5-S1 fusion with increasing right   lower extending numbness tingling and pain    ADDITIONAL CLINICAL INFORMATION: Not Applicable    TECHNIQUE: Multiplanar, multisequence MRI was performed of the lumbar   spine T11-12 through S4 3-4.  IV Contrast: Gadavist  7.5 cc administered   0 cc discarded    PRIOR STUDIES: Comparison is made with the prior MRI of 9/5/2024 and the   CT of 10/28/2024.    The visualized thoracic and lumbosacral vertebral bodies are normal in   height and signal intensity. There is a metallic interposition graft   present at L5-S1. There has been a L5 and S1 laminectomy. Postoperative   changes are identified. There are transpedicular screws L5 and S1   bilaterally with connecting rods. A small fluid collection is identified   in the dorsal soft tissues of the subcutaneous fat at L3-4 measuring 3.7   cm in craniocaudal diameter by 2.2 cm  AP diameter by 3.3 cm   transversely. This likely represents a small fluid collection or seroma.   At the L5-S1 level there is mild effacement of the ventral epidural fat   on the right which could be due to small disc herniation versus   postoperative changes. No large disc herniations are identified.    The conus terminates normally at the L1 level.    IMPRESSION: Interposition graft L5-S1 with transpedicular screws,   connecting rods and immature bony fusion mass L5-S1 post L5 and S1   laminectomies. No large disc herniation or significant thecal sac   compression. Mild nonspecific signal ventrally on the right at L5-S1 may   represent postoperative changes versus small disc herniation. Small   subcutaneous fluid collection L3-4.    --- End of Report ---            REDD PARMAR MD; Attending Radiologist  This document has been electronically signed. Oct 30 2024  1:51PM    < end of copied text >  < from: CT Abdomen and Pelvis w/ IV Cont (10.29.24 @ 18:56) >    ACC: [id number] EXAM:  CT ABDOMEN AND PELVIS IC   ORDERED BY: SAVI ROD     PROCEDURE DATE:  10/29/2024          INTERPRETATION:  CLINICAL INFORMATION: Right lower quadrant tenderness   and hypotension    COMPARISON: 8/10/2019    CONTRAST/COMPLICATIONS:  IV Contrast: Omnipaque 350  90 cc administered   10 cc discarded  Oral Contrast: NONE  Complications: None reported at time of study completion    PROCEDURE:  CT of the Abdomen and Pelvis was performed.  Sagittal and coronal reformats were performed.    FINDINGS:  LOWER CHEST: Dependent atelectasis. Branching clustered nodular opacities   at the right lung base, consistent with mucous impacted airways.    LIVER: Within normal limits.  BILE DUCTS: Normal caliber.  GALLBLADDER: Cholecystectomy.  SPLEEN: Within normal limits.  PANCREAS: Within normal limits.  ADRENALS: Within normal limits.  KIDNEYS/URETERS: Within normal limits.    BLADDER: Within normal limits.  REPRODUCTIVE ORGANS: Metallic clips and bilateral adnexa.    BOWEL: Sleeve gastrectomy. No bowel obstruction. Appendix is normal. Mild   uncomplicated diverticulosis.  PERITONEUM/RETROPERITONEUM: Within normal limits.  VESSELS: Within normal limits.  LYMPH NODES: No lymphadenopathy.  ABDOMINAL WALL: Within normal limits.  BONES: Posterior fusion at L5/S1.    IMPRESSION:  No evidence of acute abnormality in the abdomen and pelvis.    Branching clustered nodular opacities in the right lower lobe, consistent   with impacted small airways.      --- End of Report ---          ALEC TRAMMELL MD; Resident Radiologist  This document has been electronically signed.  PEGGY VINCENT MD; Attending Radiologist  This document has been electronically signed. Oct 29 2024  8:32PM    < end of copied text >  < from: Xray Knee 1 or 2 Views, Bilateral (10.29.24 @ 14:29) >    ACC: [id number] EXAM:  XR KNEE 1-2 VIEWS  BI   ORDERED BY: SAVI ROD     PROCEDURE DATE:  10/29/2024          INTERPRETATION:  Clinical indication: Knee pain status post fall in   hospital.    AP and lateral radiograph the bilateral knees were performed.    IMPRESSION:    Left knee: No evidence of acute fracture or dislocation. Joint spaces are   preserved. No joint effusion.    Right knee: No evidence of acute fracture or dislocation. Joint spaces   are preserved. Bone island within the proximal tibia. Small to moderate   knee joint effusion.    --- End of Report ---            ABIGAIL PETIT MD; Attending Radiologist  This document has been electronically signed. Oct 29 2024  2:32PM    < end of copied text >

## 2024-10-31 NOTE — PROGRESS NOTE ADULT - SUBJECTIVE AND OBJECTIVE BOX
Patient is a 34y old  Female who presents with a chief complaint of 10/24 s/p L5-S1 JD thurman (31 Oct 2024 08:12)      SUBJECTIVE / OVERNIGHT EVENTS: Pt in bed, feels well.     MEDICATIONS  (STANDING):  enoxaparin Injectable 40 milliGRAM(s) SubCutaneous every 24 hours  gabapentin 400 milliGRAM(s) Oral every 8 hours  magnesium gluconate 500 milliGRAM(s) Oral once  midodrine. 5 milliGRAM(s) Oral every 8 hours  multivitamin/minerals/iron Oral Solution (CENTRUM) 15 milliLiter(s) Oral daily  polyethylene glycol 3350 17 Gram(s) Oral two times a day  potassium chloride    Tablet ER 40 milliEquivalent(s) Oral every 4 hours  senna 2 Tablet(s) Oral at bedtime    MEDICATIONS  (PRN):  acetaminophen     Tablet .. 650 milliGRAM(s) Oral every 6 hours PRN Temp greater or equal to 38C (100.4F), Mild Pain (1 - 3)  calamine/zinc oxide Lotion 1 Application(s) Topical every 6 hours PRN Itching  diphenhydrAMINE 25 milliGRAM(s) Oral every 4 hours PRN Rash and/or Itching  FIRST- Mouthwash  BLM 5 milliLiter(s) Swish and Spit every 6 hours PRN Mouth Care  lidocaine 2% Viscous 5 milliLiter(s) Swish and Spit every 6 hours PRN Mouth Care  methocarbamol 500 milliGRAM(s) Oral every 6 hours PRN Muscle Spasm  traMADol 50 milliGRAM(s) Oral every 4 hours PRN Moderate Pain (4 - 6)      CAPILLARY BLOOD GLUCOSE        I&O's Summary    30 Oct 2024 07:01  -  31 Oct 2024 07:00  --------------------------------------------------------  IN: 1000 mL / OUT: 2860 mL / NET: -1860 mL        PHYSICAL EXAM:  T(C): 37.5 (10-31-24 @ 16:03), Max: 37.7 (10-30-24 @ 17:00)  HR: 86 (10-31-24 @ 16:03) (67 - 100)  BP: 94/62 (10-31-24 @ 16:03) (90/60 - 105/70)  RR: 18 (10-31-24 @ 16:03) (18 - 20)  SpO2: 97% (10-31-24 @ 16:03) (97% - 99%)  Awake, alert, RRR, abdomen soft, good air entry anteriorly, moving all extremities      LABS:                        8.7    8.57  )-----------( 366      ( 31 Oct 2024 07:12 )             28.1     10-31    132[L]  |  99  |  11  ----------------------------<  137[H]  3.5   |  22  |  0.67    Ca    8.4      31 Oct 2024 07:02  Phos  3.5     10-31  Mg     1.9     10-31            Urinalysis Basic - ( 31 Oct 2024 07:02 )    Color: x / Appearance: x / SG: x / pH: x  Gluc: 137 mg/dL / Ketone: x  / Bili: x / Urobili: x   Blood: x / Protein: x / Nitrite: x   Leuk Esterase: x / RBC: x / WBC x   Sq Epi: x / Non Sq Epi: x / Bacteria: x        RADIOLOGY & ADDITIONAL TESTS:    Imaging Personally Reviewed:    Consultant(s) Notes Reviewed:      Care Discussed with Consultants/Other Providers: Nsx

## 2024-10-31 NOTE — PROGRESS NOTE ADULT - PROBLEM SELECTOR PLAN 3
S/p surgery as above. CT L-spine demonstrates post-op changes with acceptable implant placement and spinal alignment.     10/30  MRI L-spine Interposition graft L5-S1 with transpedicular screws, connecting rods and immature bony fusion mass L5-S1 post L5 and S1 laminectomies. No large disc herniation or significant thecal sac compression. Mild nonspecific signal ventrally on the right at L5-S1 may represent postoperative changes versus small disc herniation. Small subcutaneous fluid collection L3-4.    Continue postop pain control, PT- improving.
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
## 2024-10-31 NOTE — PROGRESS NOTE ADULT - TIME BILLING
Patient in bed, no distress.  Back pain improving.  30 cc hemovac DC'ed.  Tm 101.8, Fever w/u in progress.  DC planning.    Case d/w pt and Hector HuizarPA).
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
## 2024-11-01 ENCOUNTER — TRANSCRIPTION ENCOUNTER (OUTPATIENT)
Age: 34
End: 2024-11-01

## 2024-11-01 ENCOUNTER — RESULT REVIEW (OUTPATIENT)
Age: 34
End: 2024-11-01

## 2024-11-01 VITALS
HEART RATE: 84 BPM | TEMPERATURE: 98 F | RESPIRATION RATE: 18 BRPM | DIASTOLIC BLOOD PRESSURE: 63 MMHG | SYSTOLIC BLOOD PRESSURE: 102 MMHG | OXYGEN SATURATION: 98 %

## 2024-11-01 LAB
ANION GAP SERPL CALC-SCNC: 13 MMOL/L — SIGNIFICANT CHANGE UP (ref 5–17)
BUN SERPL-MCNC: 11 MG/DL — SIGNIFICANT CHANGE UP (ref 7–23)
CALCIUM SERPL-MCNC: 9.1 MG/DL — SIGNIFICANT CHANGE UP (ref 8.4–10.5)
CHLORIDE SERPL-SCNC: 103 MMOL/L — SIGNIFICANT CHANGE UP (ref 96–108)
CO2 SERPL-SCNC: 22 MMOL/L — SIGNIFICANT CHANGE UP (ref 22–31)
CREAT SERPL-MCNC: 0.63 MG/DL — SIGNIFICANT CHANGE UP (ref 0.5–1.3)
EGFR: 119 ML/MIN/1.73M2 — SIGNIFICANT CHANGE UP
GLUCOSE SERPL-MCNC: 94 MG/DL — SIGNIFICANT CHANGE UP (ref 70–99)
MAGNESIUM SERPL-MCNC: 2.1 MG/DL — SIGNIFICANT CHANGE UP (ref 1.6–2.6)
PHOSPHATE SERPL-MCNC: 3.5 MG/DL — SIGNIFICANT CHANGE UP (ref 2.5–4.5)
POTASSIUM SERPL-MCNC: 4.4 MMOL/L — SIGNIFICANT CHANGE UP (ref 3.5–5.3)
POTASSIUM SERPL-SCNC: 4.4 MMOL/L — SIGNIFICANT CHANGE UP (ref 3.5–5.3)
SODIUM SERPL-SCNC: 138 MMOL/L — SIGNIFICANT CHANGE UP (ref 135–145)

## 2024-11-01 PROCEDURE — 85025 COMPLETE CBC W/AUTO DIFF WBC: CPT

## 2024-11-01 PROCEDURE — 83735 ASSAY OF MAGNESIUM: CPT

## 2024-11-01 PROCEDURE — C9399: CPT

## 2024-11-01 PROCEDURE — 97166 OT EVAL MOD COMPLEX 45 MIN: CPT

## 2024-11-01 PROCEDURE — 81003 URINALYSIS AUTO W/O SCOPE: CPT

## 2024-11-01 PROCEDURE — 97161 PT EVAL LOW COMPLEX 20 MIN: CPT

## 2024-11-01 PROCEDURE — 82947 ASSAY GLUCOSE BLOOD QUANT: CPT

## 2024-11-01 PROCEDURE — 82803 BLOOD GASES ANY COMBINATION: CPT

## 2024-11-01 PROCEDURE — 71045 X-RAY EXAM CHEST 1 VIEW: CPT

## 2024-11-01 PROCEDURE — 84100 ASSAY OF PHOSPHORUS: CPT

## 2024-11-01 PROCEDURE — 82330 ASSAY OF CALCIUM: CPT

## 2024-11-01 PROCEDURE — 81001 URINALYSIS AUTO W/SCOPE: CPT

## 2024-11-01 PROCEDURE — 83605 ASSAY OF LACTIC ACID: CPT

## 2024-11-01 PROCEDURE — 84295 ASSAY OF SERUM SODIUM: CPT

## 2024-11-01 PROCEDURE — 84300 ASSAY OF URINE SODIUM: CPT

## 2024-11-01 PROCEDURE — 97530 THERAPEUTIC ACTIVITIES: CPT

## 2024-11-01 PROCEDURE — 82962 GLUCOSE BLOOD TEST: CPT

## 2024-11-01 PROCEDURE — 85018 HEMOGLOBIN: CPT

## 2024-11-01 PROCEDURE — 81025 URINE PREGNANCY TEST: CPT

## 2024-11-01 PROCEDURE — 87637 SARSCOV2&INF A&B&RSV AMP PRB: CPT

## 2024-11-01 PROCEDURE — 97535 SELF CARE MNGMENT TRAINING: CPT

## 2024-11-01 PROCEDURE — 70450 CT HEAD/BRAIN W/O DYE: CPT | Mod: MC

## 2024-11-01 PROCEDURE — 85027 COMPLETE CBC AUTOMATED: CPT

## 2024-11-01 PROCEDURE — 72158 MRI LUMBAR SPINE W/O & W/DYE: CPT | Mod: MC

## 2024-11-01 PROCEDURE — 93306 TTE W/DOPPLER COMPLETE: CPT

## 2024-11-01 PROCEDURE — 94640 AIRWAY INHALATION TREATMENT: CPT

## 2024-11-01 PROCEDURE — 93005 ELECTROCARDIOGRAM TRACING: CPT

## 2024-11-01 PROCEDURE — 76000 FLUOROSCOPY <1 HR PHYS/QHP: CPT

## 2024-11-01 PROCEDURE — 86900 BLOOD TYPING SEROLOGIC ABO: CPT

## 2024-11-01 PROCEDURE — 99232 SBSQ HOSP IP/OBS MODERATE 35: CPT

## 2024-11-01 PROCEDURE — 71275 CT ANGIOGRAPHY CHEST: CPT | Mod: MC

## 2024-11-01 PROCEDURE — 85014 HEMATOCRIT: CPT

## 2024-11-01 PROCEDURE — 86850 RBC ANTIBODY SCREEN: CPT

## 2024-11-01 PROCEDURE — 86901 BLOOD TYPING SEROLOGIC RH(D): CPT

## 2024-11-01 PROCEDURE — 82553 CREATINE MB FRACTION: CPT

## 2024-11-01 PROCEDURE — 80053 COMPREHEN METABOLIC PANEL: CPT

## 2024-11-01 PROCEDURE — 80048 BASIC METABOLIC PNL TOTAL CA: CPT

## 2024-11-01 PROCEDURE — 72131 CT LUMBAR SPINE W/O DYE: CPT | Mod: MC

## 2024-11-01 PROCEDURE — 73560 X-RAY EXAM OF KNEE 1 OR 2: CPT

## 2024-11-01 PROCEDURE — 83930 ASSAY OF BLOOD OSMOLALITY: CPT

## 2024-11-01 PROCEDURE — 83935 ASSAY OF URINE OSMOLALITY: CPT

## 2024-11-01 PROCEDURE — 97116 GAIT TRAINING THERAPY: CPT

## 2024-11-01 PROCEDURE — 93306 TTE W/DOPPLER COMPLETE: CPT | Mod: 26

## 2024-11-01 PROCEDURE — 93970 EXTREMITY STUDY: CPT

## 2024-11-01 PROCEDURE — C1713: CPT

## 2024-11-01 PROCEDURE — C1889: CPT

## 2024-11-01 PROCEDURE — 84484 ASSAY OF TROPONIN QUANT: CPT

## 2024-11-01 PROCEDURE — 87040 BLOOD CULTURE FOR BACTERIA: CPT

## 2024-11-01 PROCEDURE — 84132 ASSAY OF SERUM POTASSIUM: CPT

## 2024-11-01 PROCEDURE — 74177 CT ABD & PELVIS W/CONTRAST: CPT | Mod: MC

## 2024-11-01 PROCEDURE — 82435 ASSAY OF BLOOD CHLORIDE: CPT

## 2024-11-01 PROCEDURE — A9585: CPT

## 2024-11-01 RX ORDER — OXYCODONE HYDROCHLORIDE 30 MG/1
1 TABLET ORAL
Qty: 12 | Refills: 0
Start: 2024-11-01 | End: 2024-11-03

## 2024-11-01 RX ORDER — POLYETHYLENE GLYCOL 3350 17 G/17G
17 POWDER, FOR SOLUTION ORAL
Qty: 0 | Refills: 0 | DISCHARGE
Start: 2024-11-01

## 2024-11-01 RX ORDER — OXYCODONE HYDROCHLORIDE 30 MG/1
5 TABLET ORAL EVERY 6 HOURS
Refills: 0 | Status: DISCONTINUED | OUTPATIENT
Start: 2024-11-01 | End: 2024-11-01

## 2024-11-01 RX ORDER — IRON FUM,PS/FOLIC/BCOMP,C NO.9 125 MG-1MG
1 CAPSULE ORAL
Qty: 0 | Refills: 0 | DISCHARGE

## 2024-11-01 RX ORDER — NALOXONE HYDROCHLORIDE 1 MG/ML
1 INJECTION, SOLUTION INTRAMUSCULAR; INTRAVENOUS; SUBCUTANEOUS
Qty: 1 | Refills: 0
Start: 2024-11-01 | End: 2024-11-01

## 2024-11-01 RX ORDER — MIDODRINE HYDROCHLORIDE 2.5 MG/1
1 TABLET ORAL
Qty: 42 | Refills: 0
Start: 2024-11-01 | End: 2024-11-14

## 2024-11-01 RX ORDER — METHOCARBAMOL 500 MG/1
1 TABLET ORAL
Qty: 21 | Refills: 0
Start: 2024-11-01 | End: 2024-11-07

## 2024-11-01 RX ORDER — TRAMADOL HYDROCHLORIDE 50 MG/1
50 TABLET, COATED ORAL EVERY 4 HOURS
Refills: 0 | Status: DISCONTINUED | OUTPATIENT
Start: 2024-11-01 | End: 2024-11-01

## 2024-11-01 RX ORDER — SENNA 187 MG
2 TABLET ORAL
Qty: 0 | Refills: 0 | DISCHARGE
Start: 2024-11-01

## 2024-11-01 RX ORDER — ACETAMINOPHEN 500 MG
2 TABLET ORAL
Qty: 0 | Refills: 0 | DISCHARGE
Start: 2024-11-01

## 2024-11-01 RX ORDER — GABAPENTIN 300 MG/1
1 CAPSULE ORAL
Qty: 90 | Refills: 0
Start: 2024-11-01 | End: 2024-11-30

## 2024-11-01 RX ADMIN — GABAPENTIN 400 MILLIGRAM(S): 300 CAPSULE ORAL at 13:24

## 2024-11-01 RX ADMIN — TRAMADOL HYDROCHLORIDE 50 MILLIGRAM(S): 50 TABLET, COATED ORAL at 06:07

## 2024-11-01 RX ADMIN — Medication 15 MILLILITER(S): at 13:25

## 2024-11-01 RX ADMIN — GABAPENTIN 400 MILLIGRAM(S): 300 CAPSULE ORAL at 05:04

## 2024-11-01 RX ADMIN — MIDODRINE HYDROCHLORIDE 5 MILLIGRAM(S): 2.5 TABLET ORAL at 05:04

## 2024-11-01 RX ADMIN — MIDODRINE HYDROCHLORIDE 5 MILLIGRAM(S): 2.5 TABLET ORAL at 13:24

## 2024-11-01 RX ADMIN — POLYETHYLENE GLYCOL 3350 17 GRAM(S): 17 POWDER, FOR SOLUTION ORAL at 05:04

## 2024-11-01 RX ADMIN — Medication 650 MILLIGRAM(S): at 07:37

## 2024-11-01 RX ADMIN — Medication 650 MILLIGRAM(S): at 08:07

## 2024-11-01 NOTE — PROGRESS NOTE ADULT - PROBLEM SELECTOR PLAN 1
Likely vagal episode while straining to have a BM.     Hypotensive subsequently, now improved. CT a/p without any acute bleed, CBC stable. CT head negative. On Midodrine 5mg tid.     SBP in the 90s now but asymptomatic, able to ambulate without dizziness.    Keep on Midodrine for now, f/u w PMD.
Likely vagal episode while straining to have a BP.     Hypotensive in the 80s- 1L of NS ordered. Brought back to bed, awake, alert, neuro exam unchanged.     Monitor.
Likely vagal episode while straining to have a BM.     Hypotensive subsequently, now improved. CT a/p without any acute bleed, CBC stable. On Midodrine 5mg tid.     SBP in the 90s now but asymptomatic, able to ambulate without dizziness.
No clear infectious source, like postop/inflammatory fever. Now resolved.     Empiric abx d/sinai, ID following.     Leukocytosis likely due to Decadron.    CTA chest done- No pulmonary embolism. Right lower lobe tree in bud opacities likely represented mucoid impacted distal airways. Indeterminate 8mm soft tissue nodule in the left breast, for which correlation with dedicated breast imaging is suggested.    Dyspnea now resolved, on room air. Recommend outpatient mammogram.

## 2024-11-01 NOTE — PROGRESS NOTE ADULT - PROBLEM SELECTOR PLAN 2
Post-op course c/b fever on 10/26 w/ leukocytosis. Infectious w/u at that time negative, abx d/sinai.     CTA chest done 10/28- No pulmonary embolism. Right lower lobe tree in bud opacities likely represented mucoid impacted distal airways. Indeterminate 8mm soft tissue nodule in the left breast, for which correlation with dedicated breast imaging is suggested.    Dyspnea now resolved, on room air. Incentive spirometry to help clear secretions. Recommend outpatient mammogram- discussed with patient.     Fever again on 10/30 but no clear source- workup negative. Per ID, monitor off abx.
Post-op course c/b fever on 10/26 w/ leukocytosis. Infectious w/u at that time negative, abx d/sinai.     CTA chest done 10/28- No pulmonary embolism. Right lower lobe tree in bud opacities likely represented mucoid impacted distal airways. Indeterminate 8mm soft tissue nodule in the left breast, for which correlation with dedicated breast imaging is suggested.    Dyspnea now resolved, on room air. Incentive spirometry to help clear secretions. Recommend outpatient mammogram.    Now w fever again but no clear source. Per ID, monitor off abx.
S/p surgery as above. CT L-spine demonstrates post-op changes with acceptable implant placement and spinal alignment.     Persistent postop pain- Neurontin increased today. Pt requesting IV Tylenol as it helped w pain before.
No clear infectious source, like postop/inflammatory fever. Now resolved.     Empiric abx d/sinai, ID following.     Leukocytosis likely due to Decadron.    CTA chest done 10/28- No pulmonary embolism. Right lower lobe tree in bud opacities likely represented mucoid impacted distal airways. Indeterminate 8mm soft tissue nodule in the left breast, for which correlation with dedicated breast imaging is suggested.    Dyspnea now resolved, on room air. Incentive spirometry to help clear secretions. Recommend outpatient mammogram.
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
## 2024-11-01 NOTE — PROGRESS NOTE ADULT - SUBJECTIVE AND OBJECTIVE BOX
SUBJECTIVE: Patient seen evaluated at bedside, in no acute distress. Vitals and labs reviewed, BP stable on midodrine 5q8h. Observed patient ambulating with PT, using rolling walker denies dizziness, headache.     Vital Signs Last 24 Hrs  T(C): 37.1 (11-01-24 @ 07:50), Max: 37.5 (10-31-24 @ 16:03)  T(F): 98.8 (11-01-24 @ 07:50), Max: 99.5 (10-31-24 @ 16:03)  HR: 60 (11-01-24 @ 07:50) (60 - 88)  BP: 90/60 (11-01-24 @ 07:50) (90/60 - 137/80)  BP(mean): --  RR: 18 (11-01-24 @ 07:50) (18 - 18)  SpO2: 98% (11-01-24 @ 07:50) (97% - 98%)    PHYSICAL EXAM:  Constitutional: No Acute Distress   Neurological: Awake/alert oriented x3 (person, place and time), EOMI. PERRL 3 mm briskly reactive b/l, no facial asymmetry, b/l upper extremities 5/5 strength, B/L Lower extremities symmetric 4+/5 proximally, 5/5 distally,  strength pain limited. no sensory deficits   Incision: lumbar - sacral incision clean dry intact. Prev drain site healing well   Pulmonary: Clear lungs   Cardiovascular: Regular rate and rhythm   Gastrointestinal: Soft, Non-tender, Non-distended abdomen, +bowel sounds x 4  Extremities: No calf tenderness bilaterally, no edema     LABS:                       8.7    8.57  )-----------( 366      ( 31 Oct 2024 07:12 )             28.1    11-01    138  |  103  |  11  ----------------------------<  94  4.4   |  22  |  0.63    Ca    9.1      01 Nov 2024 06:11  Phos  3.5     11-01  Mg     2.1     11-01    IMAGING:   < from: MR Lumbar Spine w/wo IV Cont (10.30.24 @ 13:17) >  ACC: [id number] EXAM:  MR SPINE LUMBAR WAW IC   ORDERED BY: SAVI ROD   PROCEDURE DATE:  10/30/2024    INTERPRETATION:  CLINICAL INFORMATION: L5-S1 fusion with increasing right   lower extending numbness tingling and pain     ADDITIONAL CLINICAL INFORMATION: Not Applicable  TECHNIQUE: Multiplanar, multisequence MRI was performed of the lumbar   spine T11-12 through S4 3-4.  IV Contrast: Gadavist  7.5 cc administered   0 cc discarded    PRIOR STUDIES: Comparison is made with the prior MRI of 9/5/2024 and the   CT of 10/28/2024.  The visualized thoracic and lumbosacral vertebral bodies are normal in   height and signal intensity. There is a metallic interposition graft   present at L5-S1. There has been a L5 and S1 laminectomy. Postoperative   changes are identified. There are transpedicular screws L5 and S1   bilaterally with connecting rods. A small fluid collection is identified   in the dorsal soft tissues of the subcutaneous fat at L3-4 measuring 3.7   cm in craniocaudal diameter by 2.2 cm  AP diameter by 3.3 cm   transversely. This likely represents a small fluid collection or seroma.   At the L5-S1 level there is mild effacement of the ventral epidural fat   on the right which could be due to small disc herniation versus   postoperative changes. No large disc herniations are identified.  The conus terminates normally at the L1 level.    IMPRESSION: Interposition graft L5-S1 with transpedicular screws,   connecting rods and immature bony fusion mass L5-S1 post L5 and S1   laminectomies. No large disc herniation or significant thecal sac   compression. Mild nonspecific signal ventrally on the right at L5-S1 may   represent postoperative changes versus small disc herniation. Small   subcutaneous fluid collection L3-4.    --- End of Report ---  REDD PARMAR MD; Attending Radiologist  This document has been electronically signed. Oct 30 2024  1:51PM  < end of copied text >    MEDICATIONS  (STANDING):  enoxaparin Injectable 40 milliGRAM(s) SubCutaneous every 24 hours  gabapentin 400 milliGRAM(s) Oral every 8 hours  midodrine. 5 milliGRAM(s) Oral every 8 hours  multivitamin/minerals/iron Oral Solution (CENTRUM) 15 milliLiter(s) Oral daily  polyethylene glycol 3350 17 Gram(s) Oral two times a day  senna 2 Tablet(s) Oral at bedtime    MEDICATIONS  (PRN):  acetaminophen     Tablet .. 650 milliGRAM(s) Oral every 6 hours PRN Temp greater or equal to 38C (100.4F), Mild Pain (1 - 3)  calamine/zinc oxide Lotion 1 Application(s) Topical every 6 hours PRN Itching  diphenhydrAMINE 25 milliGRAM(s) Oral every 4 hours PRN Rash and/or Itching  FIRST- Mouthwash  BLM 5 milliLiter(s) Swish and Spit every 6 hours PRN Mouth Care  lidocaine 2% Viscous 5 milliLiter(s) Swish and Spit every 6 hours PRN Mouth Care  methocarbamol 500 milliGRAM(s) Oral every 6 hours PRN Muscle Spasm  traMADol 50 milliGRAM(s) Oral every 4 hours PRN Moderate Pain (4 - 6)    DIET: regular

## 2024-11-01 NOTE — DISCHARGE NOTE PROVIDER - NSDCMRMEDTOKEN_GEN_ALL_CORE_FT
acetaminophen 325 mg oral tablet: 2 tab(s) orally every 6 hours As needed Temp greater or equal to 38C (100.4F), Mild Pain (1 - 3)  gabapentin 300 mg oral capsule: 1 cap(s) orally every 8 hours  methocarbamol 500 mg oral tablet: 1 tab(s) orally every 8 hours as needed for Muscle Spasm  midodrine 5 mg oral tablet: 1 tab(s) orally every 8 hours  multivitamin with iron: 1 tab(s) orally once a day  oxyCODONE 5 mg oral tablet: 1 tab(s) orally every 6 hours as needed for moderate to severe pain MDD: 4 tablets  polyethylene glycol 3350 oral powder for reconstitution: 17 gram(s) orally 2 times a day  rolling walker s/p L5-S1 lami and fusion: to aide walking  senna leaf extract oral tablet: 2 tab(s) orally once a day (at bedtime)   acetaminophen 325 mg oral tablet: 2 tab(s) orally every 6 hours As needed Temp greater or equal to 38C (100.4F), Mild Pain (1 - 3)  gabapentin 300 mg oral capsule: 1 cap(s) orally every 8 hours  methocarbamol 500 mg oral tablet: 1 tab(s) orally every 8 hours as needed for Muscle Spasm  midodrine 5 mg oral tablet: 1 tab(s) orally every 8 hours  multivitamin with iron: 1 tab(s) orally once a day  Narcan 4 mg/0.1 mL nasal spray: 1 spray(s) intranasally once as needed for OPIOID OVERDOSE RESCUE  oxyCODONE 5 mg oral tablet: 1 tab(s) orally every 6 hours as needed for moderate to severe pain MDD: 4 tablets  polyethylene glycol 3350 oral powder for reconstitution: 17 gram(s) orally 2 times a day  rolling walker s/p L5-S1 lami and fusion: to aide walking  senna leaf extract oral tablet: 2 tab(s) orally once a day (at bedtime)

## 2024-11-01 NOTE — DISCHARGE NOTE PROVIDER - CARE PROVIDER_API CALL
Nghia Joyner  Neurosurgery  [address] [place] [address], [facility] [address]  [place] [address]  Phone: [phone]  Fax: [phone]  Scheduled Appointment: 11/05/2024 11:00 AM

## 2024-11-01 NOTE — PROGRESS NOTE ADULT - SUBJECTIVE AND OBJECTIVE BOX
Patient is a 34y old  Female who presents with a chief complaint of L5-s1 lami (01 Nov 2024 11:07)      SUBJECTIVE / OVERNIGHT EVENTS: Pt up in chair, feels well.     MEDICATIONS  (STANDING):  enoxaparin Injectable 40 milliGRAM(s) SubCutaneous every 24 hours  gabapentin 400 milliGRAM(s) Oral every 8 hours  midodrine. 5 milliGRAM(s) Oral every 8 hours  multivitamin/minerals/iron Oral Solution (CENTRUM) 15 milliLiter(s) Oral daily  polyethylene glycol 3350 17 Gram(s) Oral two times a day  senna 2 Tablet(s) Oral at bedtime    MEDICATIONS  (PRN):  acetaminophen     Tablet .. 650 milliGRAM(s) Oral every 6 hours PRN Temp greater or equal to 38C (100.4F), Mild Pain (1 - 3)  calamine/zinc oxide Lotion 1 Application(s) Topical every 6 hours PRN Itching  FIRST- Mouthwash  BLM 5 milliLiter(s) Swish and Spit every 6 hours PRN Mouth Care  methocarbamol 500 milliGRAM(s) Oral every 6 hours PRN Muscle Spasm  traMADol 50 milliGRAM(s) Oral every 4 hours PRN Severe Pain (7 - 10)      CAPILLARY BLOOD GLUCOSE        I&O's Summary      PHYSICAL EXAM:  T(C): 36.3 (11-01-24 @ 13:13), Max: 37.5 (10-31-24 @ 16:03)  HR: 76 (11-01-24 @ 13:13) (60 - 88)  BP: 90/61 (11-01-24 @ 13:13) (90/60 - 137/80)  RR: 18 (11-01-24 @ 13:13) (18 - 18)  SpO2: 98% (11-01-24 @ 13:13) (97% - 98%)    Up in chair, awake, alert, RRR, abdomen soft, good air entry anteriorly, moving all extremities                        8.7    8.57  )-----------( 366      ( 31 Oct 2024 07:12 )             28.1     11-01    138  |  103  |  11  ----------------------------<  94  4.4   |  22  |  0.63    Ca    9.1      01 Nov 2024 06:11  Phos  3.5     11-01  Mg     2.1     11-01            Urinalysis Basic - ( 01 Nov 2024 06:11 )    Color: x / Appearance: x / SG: x / pH: x  Gluc: 94 mg/dL / Ketone: x  / Bili: x / Urobili: x   Blood: x / Protein: x / Nitrite: x   Leuk Esterase: x / RBC: x / WBC x   Sq Epi: x / Non Sq Epi: x / Bacteria: x        RADIOLOGY & ADDITIONAL TESTS:    Imaging Personally Reviewed:    Consultant(s) Notes Reviewed:      Care Discussed with Consultants/Other Providers: Nsx

## 2024-11-01 NOTE — DISCHARGE NOTE NURSING/CASE MANAGEMENT/SOCIAL WORK - FINANCIAL ASSISTANCE
[facility] provides services at a reduced cost to those who are determined to be eligible through [facility]’s financial assistance program. Information regarding [facility]’s financial assistance program can be found by going to [email or web address] or by calling [phone].

## 2024-11-01 NOTE — PROGRESS NOTE ADULT - SUBJECTIVE AND OBJECTIVE BOX
[facility], Division of Infectious Diseases  JESSICA Mcmullen Y. Patel, S. Shah, G. [facility]  [phone]    Name: SILVINA KUMAR  Age: 34y  Gender: Female  MRN: [id number]    Interval History:  No acute overnight events. Afebrile  Notes reviewed    Antibiotics:      Medications:  acetaminophen     Tablet .. 650 milliGRAM(s) Oral every 6 hours PRN  calamine/zinc oxide Lotion 1 Application(s) Topical every 6 hours PRN  enoxaparin Injectable 40 milliGRAM(s) SubCutaneous every 24 hours  FIRST- Mouthwash  BLM 5 milliLiter(s) Swish and Spit every 6 hours PRN  gabapentin 400 milliGRAM(s) Oral every 8 hours  methocarbamol 500 milliGRAM(s) Oral every 6 hours PRN  midodrine. 5 milliGRAM(s) Oral every 8 hours  multivitamin/minerals/iron Oral Solution (CENTRUM) 15 milliLiter(s) Oral daily  polyethylene glycol 3350 17 Gram(s) Oral two times a day  senna 2 Tablet(s) Oral at bedtime  traMADol 50 milliGRAM(s) Oral every 4 hours PRN      Review of Systems:  A 10-point review of systems was obtained.   Review of systems otherwise negative except as previously noted.    Allergies: penicillins (Rash)    For details regarding the patient's past medical history, social history, family history, and other miscellaneous elements, please refer the initial infectious diseases consultation and/or the admitting history and physical examination for this admission.    Objective:  Vitals:   T(C): 36.3 (11-01-24 @ 13:13), Max: 37.5 (10-31-24 @ 16:03)  HR: 76 (11-01-24 @ 13:13) (60 - 88)  BP: 90/61 (11-01-24 @ 13:13) (90/60 - 137/80)  RR: 18 (11-01-24 @ 13:13) (18 - 18)  SpO2: 98% (11-01-24 @ 13:13) (97% - 98%)    Physical Examination:  General: no acute distress  HEENT: NC/AT,  Cardio: RRR  Resp: breath sounds heard bilaterally, no rales, wheezes or rhonchi  Abd: soft,   Ext: no edema or cyanosis  Skin: warm, dry, no visible rash      Laboratory Studies:  CBC:                       8.7    8.57  )-----------( 366      ( 31 Oct 2024 07:12 )             28.1     CMP: 11-01    138  |  103  |  11  ----------------------------<  94  4.4   |  22  |  0.63    Ca    9.1      01 Nov 2024 06:11  Phos  3.5     11-01  Mg     2.1     11-01        Urinalysis Basic - ( 01 Nov 2024 06:11 )    Color: x / Appearance: x / SG: x / pH: x  Gluc: 94 mg/dL / Ketone: x  / Bili: x / Urobili: x   Blood: x / Protein: x / Nitrite: x   Leuk Esterase: x / RBC: x / WBC x   Sq Epi: x / Non Sq Epi: x / Bacteria: x        Microbiology: reviewed    Culture - Blood (collected 10-30-24 @ 14:30)  Source: .Blood BLOOD  Preliminary Report (10-31-24 @ 18:00):    No growth at 24 hours    Culture - Blood (collected 10-30-24 @ 14:20)  Source: .Blood BLOOD  Preliminary Report (10-31-24 @ 18:00):    No growth at 24 hours    Culture - Blood (collected 10-26-24 @ 05:20)  Source: .Blood BLOOD  Final Report (10-31-24 @ 11:01):    No growth at 5 days    Culture - Blood (collected 10-26-24 @ 05:10)  Source: .Blood BLOOD  Final Report (10-31-24 @ 11:01):    No growth at 5 days          Radiology: reviewed
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
## 2024-11-01 NOTE — DISCHARGE NOTE NURSING/CASE MANAGEMENT/SOCIAL WORK - PATIENT PORTAL LINK FT
You can access the FollowMyHealth Patient Portal offered by [facility] by registering at the following website: [email or web address]. By joining [facility] [employer]’s FollowMyHealth portal, you will also be able to view your health information using other applications (apps) compatible with our system.

## 2024-11-01 NOTE — DISCHARGE NOTE NURSING/CASE MANAGEMENT/SOCIAL WORK - NSDCPEFALRISK_GEN_ALL_CORE
For information on Fall & Injury Prevention, visit: [email or web address] OR  [email or web address] OR  https://www.cdc.gov/steadi/patient.html
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
## 2024-11-01 NOTE — DISCHARGE NOTE PROVIDER - NSDCCPCAREPLAN_GEN_ALL_CORE_FT
PRINCIPAL DISCHARGE DIAGNOSIS  Diagnosis: Spondylosis  Assessment and Plan of Treatment: You had the following procedure performed at [facility] on 10/24/2024: L5-S1 laminectomy and fusion with Dr. Joyner  Please follow up with Dr. Joyner at your scheduled appt. time 11AM on Tuesday 11/5/2024   You may take the following medications as needed for pain relief  - Mild pain rated 1-3/10 Tylenol (acetaminophen) 650 mg every 6 hours as needed   - Moderate [ rated 4-6/10] / Severe pain [rated 7-10/10] Oxycodone 2.5-5mg every 6 hours as needed for severe pain   - Robaxin (methocarbamol) 500 mg every 8 hours AS NEEDED for muscle spasm   *DO NOT take robaxin and oxycodone at the same time- these may have co-occuring effects of dizziness/lethargy low blood pressure*   Please make all necessary appointments and follow up. Please DO NOT take any Aspirin and NSAIDs (Advil, Aleve, Motrin, Ibuprofen) until cleared by your Neurosurgeon. Please DO NOT do any heavy lifting, bending, twisting and straining. You have surgical staples, they will be removed on follow up with your Neurosurgeon. You may shower, but NO SOAP / NO SHAMPOO. DO NOT do any scrubbing. Pat dry only. Please come to the emergency room for any of the following: altered mental status, seizures, pain uncontrolled by pain medications, fevers, leaking / bleeding from surgical site, chest pain and shortness of breath.  You have been started on a new medication, oxycodone.  Oxycodone helps to control pain. Oxycodone is an additive medication so it is important to limit the use of this medication.  Side effects include nausea, vomiting, constipation, dry mouth, lightheadedness, dizziness or drowsiness.  It is important to tell your physician if you experience any of these side effects.      SECONDARY DISCHARGE DIAGNOSES  Diagnosis: Vasovagal episode  Assessment and Plan of Treatment: Continue Midodrine 5mg every 8 hours for hypotension (improving)  Please check Blood Pressure with a manual cuff every 8 hours prior to administration of Midodrine dose. PLEASE FOLLOW UP WITH [facility] PRIMARY CARE PROVIDER WITHIN 3 DAYS TO DISCUSS WEANING OFF OF MIDODRINE    Diagnosis: Fever  Assessment and Plan of Treatment: fever 10/28- work up including cultures, Chest XRay, urinalysis negative for infectious source. Now resolved for further fevers  Incentive spirometer for mucoid impaction    Diagnosis: Breast nodule  Assessment and Plan of Treatment: Indeterminate incidental 8mm soft tissue nodule in the left breast seen on workup for fever with CT Chest.  Recommend discussion with OBGYN and further work up for characterization of lesion

## 2024-11-01 NOTE — PROGRESS NOTE ADULT - PROVIDER SPECIALTY LIST ADULT
Anesthesia
Hospitalist
Neurosurgery
Neurosurgery
Anesthesia
Infectious Disease
NSICU
Neurosurgery
Infectious Disease
Neurosurgery
NSICU
Neurosurgery
Hospitalist
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
## 2024-11-04 LAB
CULTURE RESULTS: SIGNIFICANT CHANGE UP
CULTURE RESULTS: SIGNIFICANT CHANGE UP
SPECIMEN SOURCE: SIGNIFICANT CHANGE UP
SPECIMEN SOURCE: SIGNIFICANT CHANGE UP

## 2024-12-10 ENCOUNTER — TRANSCRIPTION ENCOUNTER (OUTPATIENT)
Age: 34
End: 2024-12-10

## 2025-03-13 PROBLEM — M47.9 SPONDYLOSIS, UNSPECIFIED: Chronic | Status: ACTIVE | Noted: 2024-09-30

## 2025-03-17 ENCOUNTER — OUTPATIENT (OUTPATIENT)
Dept: OUTPATIENT SERVICES | Facility: HOSPITAL | Age: 35
LOS: 1 days | End: 2025-03-17
Payer: MEDICAID

## 2025-03-17 ENCOUNTER — APPOINTMENT (OUTPATIENT)
Dept: CT IMAGING | Facility: IMAGING CENTER | Age: 35
End: 2025-03-17
Payer: MEDICAID

## 2025-03-17 DIAGNOSIS — Z98.890 OTHER SPECIFIED POSTPROCEDURAL STATES: Chronic | ICD-10-CM

## 2025-03-17 DIAGNOSIS — Z00.8 ENCOUNTER FOR OTHER GENERAL EXAMINATION: ICD-10-CM

## 2025-03-17 DIAGNOSIS — Z98.51 TUBAL LIGATION STATUS: Chronic | ICD-10-CM

## 2025-03-17 DIAGNOSIS — Z90.3 ACQUIRED ABSENCE OF STOMACH [PART OF]: Chronic | ICD-10-CM

## 2025-03-17 PROCEDURE — 72131 CT LUMBAR SPINE W/O DYE: CPT

## 2025-03-17 PROCEDURE — 72131 CT LUMBAR SPINE W/O DYE: CPT | Mod: 26

## 2025-03-17 PROCEDURE — 76377 3D RENDER W/INTRP POSTPROCES: CPT | Mod: 26

## 2025-03-17 PROCEDURE — 76377 3D RENDER W/INTRP POSTPROCES: CPT

## (undated) DEVICE — DRAPE 1/2 SHEET 40X57"

## (undated) DEVICE — GLV 7.5 PROTEXIS (CREAM) MICRO

## (undated) DEVICE — SOL IRR POUR H2O 500ML

## (undated) DEVICE — PREP CHLORAPREP HI-LITE ORANGE 26ML

## (undated) DEVICE — ELCTR BOVIE PENCIL SMOKE EVACUATION

## (undated) DEVICE — POSITIONER JACKSON TABLE PRONEVIEW CUSHION, CHEST, HIP, THIGH PADS

## (undated) DEVICE — MIDAS REX MR7 LUBRICANT DIFFUSER CARTRIDGE

## (undated) DEVICE — CLIPPER BLADE NEURO (BLUE)

## (undated) DEVICE — DRSG AQUACEL 3.5 X 6"

## (undated) DEVICE — DRAPE EQUIPMENT BANDED BAG 30 X 30" (SHOWER CAP)

## (undated) DEVICE — PACK LAMINECTOMY LTX FR

## (undated) DEVICE — STRYKER BONE MILL & FINE BLADE

## (undated) DEVICE — SUT VICRYL PLUS 0 27" OS-6 UNDYED

## (undated) DEVICE — ELCTR GROUNDING PAD ADULT COVIDIEN

## (undated) DEVICE — SUT VICRYL 2-0 27" SH UNDYED

## (undated) DEVICE — DRAPE 3/4 SHEET W REINFORCEMENT 56X77"

## (undated) DEVICE — SOL IRR POUR NS 0.9% 1000ML

## (undated) DEVICE — ELCTR BOVIE TIP BLADE INSULATED 2.75" EDGE

## (undated) DEVICE — Device

## (undated) DEVICE — MIDAS REX MR8 MATCH HEAD FLUTED LG BORE 3MM X 14CM

## (undated) DEVICE — STRYKER BONE MILL PREP & CARTRIDGE

## (undated) DEVICE — SYR LUER LOK 20CC

## (undated) DEVICE — POSITIONER CUSHION INSERT PRONE VIEW LG

## (undated) DEVICE — GLV 8 PROTEXIS (WHITE)

## (undated) DEVICE — WARMING BLANKET UPPER ADULT

## (undated) DEVICE — VENODYNE/SCD SLEEVE CALF MEDIUM